# Patient Record
Sex: FEMALE | Race: OTHER | HISPANIC OR LATINO
[De-identification: names, ages, dates, MRNs, and addresses within clinical notes are randomized per-mention and may not be internally consistent; named-entity substitution may affect disease eponyms.]

---

## 2022-07-15 ENCOUNTER — APPOINTMENT (OUTPATIENT)
Dept: ANTEPARTUM | Facility: CLINIC | Age: 37
End: 2022-07-15

## 2022-07-15 ENCOUNTER — TRANSCRIPTION ENCOUNTER (OUTPATIENT)
Age: 37
End: 2022-07-15

## 2022-07-15 ENCOUNTER — ASOB RESULT (OUTPATIENT)
Age: 37
End: 2022-07-15

## 2022-07-15 PROBLEM — Z00.00 ENCOUNTER FOR PREVENTIVE HEALTH EXAMINATION: Status: ACTIVE | Noted: 2022-07-15

## 2022-07-15 PROCEDURE — 76801 OB US < 14 WKS SINGLE FETUS: CPT | Mod: NC

## 2022-07-15 PROCEDURE — 76813 OB US NUCHAL MEAS 1 GEST: CPT

## 2022-08-12 ENCOUNTER — APPOINTMENT (OUTPATIENT)
Dept: ANTEPARTUM | Facility: CLINIC | Age: 37
End: 2022-08-12

## 2022-08-12 ENCOUNTER — ASOB RESULT (OUTPATIENT)
Age: 37
End: 2022-08-12

## 2022-08-12 PROCEDURE — 76811 OB US DETAILED SNGL FETUS: CPT

## 2022-08-18 ENCOUNTER — EMERGENCY (EMERGENCY)
Facility: HOSPITAL | Age: 37
LOS: 1 days | Discharge: HOME | End: 2022-08-18
Attending: EMERGENCY MEDICINE | Admitting: EMERGENCY MEDICINE
Payer: MEDICAID

## 2022-08-18 VITALS
DIASTOLIC BLOOD PRESSURE: 76 MMHG | RESPIRATION RATE: 18 BRPM | HEART RATE: 98 BPM | TEMPERATURE: 98 F | OXYGEN SATURATION: 98 % | SYSTOLIC BLOOD PRESSURE: 116 MMHG

## 2022-08-18 VITALS
OXYGEN SATURATION: 99 % | HEART RATE: 94 BPM | RESPIRATION RATE: 16 BRPM | SYSTOLIC BLOOD PRESSURE: 128 MMHG | DIASTOLIC BLOOD PRESSURE: 86 MMHG | TEMPERATURE: 98 F | HEIGHT: 66 IN | WEIGHT: 164.91 LBS

## 2022-08-18 DIAGNOSIS — D25.1 INTRAMURAL LEIOMYOMA OF UTERUS: ICD-10-CM

## 2022-08-18 DIAGNOSIS — Z3A.16 16 WEEKS GESTATION OF PREGNANCY: ICD-10-CM

## 2022-08-18 DIAGNOSIS — O99.112 OTHER DISEASES OF THE BLOOD AND BLOOD-FORMING ORGANS AND CERTAIN DISORDERS INVOLVING THE IMMUNE MECHANISM COMPLICATING PREGNANCY, SECOND TRIMESTER: ICD-10-CM

## 2022-08-18 DIAGNOSIS — O34.12 MATERNAL CARE FOR BENIGN TUMOR OF CORPUS UTERI, SECOND TRIMESTER: ICD-10-CM

## 2022-08-18 DIAGNOSIS — Z20.822 CONTACT WITH AND (SUSPECTED) EXPOSURE TO COVID-19: ICD-10-CM

## 2022-08-18 DIAGNOSIS — D25.2 SUBSEROSAL LEIOMYOMA OF UTERUS: ICD-10-CM

## 2022-08-18 DIAGNOSIS — D25.0 SUBMUCOUS LEIOMYOMA OF UTERUS: ICD-10-CM

## 2022-08-18 DIAGNOSIS — Z90.79 ACQUIRED ABSENCE OF OTHER GENITAL ORGAN(S): ICD-10-CM

## 2022-08-18 DIAGNOSIS — D72.829 ELEVATED WHITE BLOOD CELL COUNT, UNSPECIFIED: ICD-10-CM

## 2022-08-18 DIAGNOSIS — Z87.59 PERSONAL HISTORY OF OTHER COMPLICATIONS OF PREGNANCY, CHILDBIRTH AND THE PUERPERIUM: ICD-10-CM

## 2022-08-18 DIAGNOSIS — R10.2 PELVIC AND PERINEAL PAIN: ICD-10-CM

## 2022-08-18 LAB
ALBUMIN SERPL ELPH-MCNC: 4.2 G/DL — SIGNIFICANT CHANGE UP (ref 3.3–5)
ALP SERPL-CCNC: 73 U/L — SIGNIFICANT CHANGE UP (ref 40–120)
ALT FLD-CCNC: 13 U/L — SIGNIFICANT CHANGE UP (ref 10–45)
ANION GAP SERPL CALC-SCNC: 11 MMOL/L — SIGNIFICANT CHANGE UP (ref 5–17)
APPEARANCE UR: CLEAR — SIGNIFICANT CHANGE UP
AST SERPL-CCNC: 17 U/L — SIGNIFICANT CHANGE UP (ref 10–40)
BACTERIA # UR AUTO: PRESENT /HPF
BASOPHILS # BLD AUTO: 0.07 K/UL — SIGNIFICANT CHANGE UP (ref 0–0.2)
BASOPHILS NFR BLD AUTO: 0.4 % — SIGNIFICANT CHANGE UP (ref 0–2)
BILIRUB SERPL-MCNC: 0.8 MG/DL — SIGNIFICANT CHANGE UP (ref 0.2–1.2)
BILIRUB UR-MCNC: NEGATIVE — SIGNIFICANT CHANGE UP
BUN SERPL-MCNC: 7 MG/DL — SIGNIFICANT CHANGE UP (ref 7–23)
CALCIUM SERPL-MCNC: 9.4 MG/DL — SIGNIFICANT CHANGE UP (ref 8.4–10.5)
CHLORIDE SERPL-SCNC: 98 MMOL/L — SIGNIFICANT CHANGE UP (ref 96–108)
CO2 SERPL-SCNC: 23 MMOL/L — SIGNIFICANT CHANGE UP (ref 22–31)
COLOR SPEC: YELLOW — SIGNIFICANT CHANGE UP
CREAT SERPL-MCNC: 0.55 MG/DL — SIGNIFICANT CHANGE UP (ref 0.5–1.3)
DIFF PNL FLD: NEGATIVE — SIGNIFICANT CHANGE UP
EGFR: 122 ML/MIN/1.73M2 — SIGNIFICANT CHANGE UP
EOSINOPHIL # BLD AUTO: 0.08 K/UL — SIGNIFICANT CHANGE UP (ref 0–0.5)
EOSINOPHIL NFR BLD AUTO: 0.5 % — SIGNIFICANT CHANGE UP (ref 0–6)
EPI CELLS # UR: SIGNIFICANT CHANGE UP /HPF (ref 0–5)
GLUCOSE SERPL-MCNC: 93 MG/DL — SIGNIFICANT CHANGE UP (ref 70–99)
GLUCOSE UR QL: NEGATIVE — SIGNIFICANT CHANGE UP
HCT VFR BLD CALC: 34 % — LOW (ref 34.5–45)
HGB BLD-MCNC: 11.9 G/DL — SIGNIFICANT CHANGE UP (ref 11.5–15.5)
IMM GRANULOCYTES NFR BLD AUTO: 0.5 % — SIGNIFICANT CHANGE UP (ref 0–1.5)
KETONES UR-MCNC: NEGATIVE — SIGNIFICANT CHANGE UP
LEUKOCYTE ESTERASE UR-ACNC: ABNORMAL
LYMPHOCYTES # BLD AUTO: 1.63 K/UL — SIGNIFICANT CHANGE UP (ref 1–3.3)
LYMPHOCYTES # BLD AUTO: 9.5 % — LOW (ref 13–44)
MCHC RBC-ENTMCNC: 29.7 PG — SIGNIFICANT CHANGE UP (ref 27–34)
MCHC RBC-ENTMCNC: 35 GM/DL — SIGNIFICANT CHANGE UP (ref 32–36)
MCV RBC AUTO: 84.8 FL — SIGNIFICANT CHANGE UP (ref 80–100)
MONOCYTES # BLD AUTO: 0.79 K/UL — SIGNIFICANT CHANGE UP (ref 0–0.9)
MONOCYTES NFR BLD AUTO: 4.6 % — SIGNIFICANT CHANGE UP (ref 2–14)
NEUTROPHILS # BLD AUTO: 14.49 K/UL — HIGH (ref 1.8–7.4)
NEUTROPHILS NFR BLD AUTO: 84.5 % — HIGH (ref 43–77)
NITRITE UR-MCNC: NEGATIVE — SIGNIFICANT CHANGE UP
NRBC # BLD: 0 /100 WBCS — SIGNIFICANT CHANGE UP (ref 0–0)
PH UR: 6 — SIGNIFICANT CHANGE UP (ref 5–8)
PLATELET # BLD AUTO: 185 K/UL — SIGNIFICANT CHANGE UP (ref 150–400)
POTASSIUM SERPL-MCNC: 4.2 MMOL/L — SIGNIFICANT CHANGE UP (ref 3.5–5.3)
POTASSIUM SERPL-SCNC: 4.2 MMOL/L — SIGNIFICANT CHANGE UP (ref 3.5–5.3)
PROT SERPL-MCNC: 8 G/DL — SIGNIFICANT CHANGE UP (ref 6–8.3)
PROT UR-MCNC: NEGATIVE MG/DL — SIGNIFICANT CHANGE UP
RBC # BLD: 4.01 M/UL — SIGNIFICANT CHANGE UP (ref 3.8–5.2)
RBC # FLD: 13.8 % — SIGNIFICANT CHANGE UP (ref 10.3–14.5)
RBC CASTS # UR COMP ASSIST: < 5 /HPF — SIGNIFICANT CHANGE UP
SARS-COV-2 RNA SPEC QL NAA+PROBE: NEGATIVE — SIGNIFICANT CHANGE UP
SODIUM SERPL-SCNC: 132 MMOL/L — LOW (ref 135–145)
SP GR SPEC: 1.01 — SIGNIFICANT CHANGE UP (ref 1–1.03)
UROBILINOGEN FLD QL: 0.2 E.U./DL — SIGNIFICANT CHANGE UP
WBC # BLD: 17.15 K/UL — HIGH (ref 3.8–10.5)
WBC # FLD AUTO: 17.15 K/UL — HIGH (ref 3.8–10.5)
WBC UR QL: < 5 /HPF — SIGNIFICANT CHANGE UP

## 2022-08-18 PROCEDURE — 76817 TRANSVAGINAL US OBSTETRIC: CPT

## 2022-08-18 PROCEDURE — 99284 EMERGENCY DEPT VISIT MOD MDM: CPT | Mod: 25

## 2022-08-18 PROCEDURE — 76805 OB US >/= 14 WKS SNGL FETUS: CPT | Mod: 26

## 2022-08-18 PROCEDURE — 76817 TRANSVAGINAL US OBSTETRIC: CPT | Mod: 26

## 2022-08-18 PROCEDURE — 36415 COLL VENOUS BLD VENIPUNCTURE: CPT

## 2022-08-18 PROCEDURE — 80053 COMPREHEN METABOLIC PANEL: CPT

## 2022-08-18 PROCEDURE — 85025 COMPLETE CBC W/AUTO DIFF WBC: CPT

## 2022-08-18 PROCEDURE — 99285 EMERGENCY DEPT VISIT HI MDM: CPT

## 2022-08-18 PROCEDURE — 87086 URINE CULTURE/COLONY COUNT: CPT

## 2022-08-18 PROCEDURE — 76805 OB US >/= 14 WKS SNGL FETUS: CPT

## 2022-08-18 PROCEDURE — 81001 URINALYSIS AUTO W/SCOPE: CPT

## 2022-08-18 PROCEDURE — 96366 THER/PROPH/DIAG IV INF ADDON: CPT

## 2022-08-18 PROCEDURE — 87635 SARS-COV-2 COVID-19 AMP PRB: CPT

## 2022-08-18 PROCEDURE — 96365 THER/PROPH/DIAG IV INF INIT: CPT

## 2022-08-18 RX ORDER — INDOMETHACIN 50 MG
1 CAPSULE ORAL
Qty: 8 | Refills: 0
Start: 2022-08-18 | End: 2022-08-19

## 2022-08-18 RX ORDER — INDOMETHACIN 50 MG
50 CAPSULE ORAL ONCE
Refills: 0 | Status: COMPLETED | OUTPATIENT
Start: 2022-08-18 | End: 2022-08-18

## 2022-08-18 RX ORDER — ACETAMINOPHEN 500 MG
1000 TABLET ORAL ONCE
Refills: 0 | Status: COMPLETED | OUTPATIENT
Start: 2022-08-18 | End: 2022-08-18

## 2022-08-18 RX ADMIN — Medication 1000 MILLIGRAM(S): at 16:00

## 2022-08-18 RX ADMIN — Medication 400 MILLIGRAM(S): at 11:01

## 2022-08-18 RX ADMIN — Medication 50 MILLIGRAM(S): at 16:11

## 2022-08-18 NOTE — ED PROVIDER NOTE - PHYSICAL EXAMINATION
VITAL SIGNS: I have reviewed nursing notes and confirm.  CONSTITUTIONAL: Well appearing, in no acute distress.   SKIN:  warm and dry, no acute rash.   HEAD:  normocephalic, atraumatic.  EYES: EOM intact; conjunctiva and sclera clear.  ENT: No nasal discharge; airway clear.   NECK: Supple.  CARD: S1, S2, Regular rate and rhythm.   RESP:  Clear to auscultation b/l, no wheezes, rales or rhonchi.  ABD: Normal bowel sounds; soft; non-distended; no guarding/ rebound. Gravid uterus. Fundus palpable to about 1 cm above umbilicus. LLQ tenderness to palpation.  EXT: Normal ROM. No peripheral edema. Pulses intact and equal b/l.  NEURO: Alert, oriented, grossly unremarkable  PSYCH: Cooperative, mood and affect appropriate. VITAL SIGNS: I have reviewed nursing notes and confirm.  CONSTITUTIONAL: Well appearing, in no acute distress.   SKIN:  warm and dry, no acute rash.   HEAD:  normocephalic, atraumatic.  EYES: EOM intact; conjunctiva and sclera clear.  ENT: No nasal discharge; airway clear.   NECK: Supple.  CARD: S1, S2, Regular rate and rhythm.   RESP:  Clear to auscultation b/l, no wheezes, rales or rhonchi.  ABD: Normal bowel sounds; soft; no guarding/ rebound. Gravid uterus. Fundus palpable to about 1-2 cm above umbilicus. LLQ tenderness to palpation.  PELVICL: deferred to gyn.  EXT: Normal ROM. No peripheral edema. Pulses intact and equal b/l.  NEURO: Alert, oriented, grossly unremarkable  PSYCH: Cooperative, mood and affect appropriate.

## 2022-08-18 NOTE — ED PROVIDER NOTE - OBJECTIVE STATEMENT
36 y/o F  at 16 weeks with PMHx of fibroids presents to ED c/o LLQ/pelvic pain starting 3 days ago. Pain is described as sharp, constant, and radiates to back. No alleviating or aggravating factors. No associated N/V/D, constipation, dysuria, or hematuria. Denies fever or chills. Pt was evaluated at [facility] 2 days ago and had an US done which showed enlargement of fibroids to 8x8 cm. She was started on Keflex for possible UTI but has not started the antibiotic. Pt here now for persistent pain. 38 y/o F  at 16 weeks gestation with PMHx of fibroids presents to ED c/o LLQ/pelvic pain starting 3 days ago. Pain is described as sharp, constant, and radiates to back. No alleviating or aggravating factors. No associated N/V/D, constipation, dysuria, hematuria, vaginal bleeding, gush of water from vagina. Denies fever or chills. Pt was evaluated at [facility] 2 days ago and had an US done which showed enlargement of fibroids to 8x8 cm. She was started on Keflex for possible UTI but has not started the antibiotic. Pt here now for persistent pain.

## 2022-08-18 NOTE — CONSULT NOTE ADULT - ASSESSMENT
38yo  at 17w with PMHx s/f uterine leiomyomas presenting to ER with 4 days of constant 9/10 sharp pain originating with LLQ and radiating to back and across lower pelvic region.   36yo  at 17w with PMHx s/f uterine leiomyomas presenting to ER with 4 days of constant 9/10 sharp pain originating with LLQ and radiating to back and across lower pelvic region.  - pelvic pain likely from degenerating fibroids, given large fibroids visualized on pelvic US.  - ovarian torsion unlikely given normal appearing ovaries without cysts/masses, with normal flow to both ovaries  - PID unlikely given afebrile state, WBC not significantly elevated, no abnormal vaginal d/c or CMT.  - UTI unlikely given normal UA, and no dysuria.  - GI etiology unlikely given no N/V, or abnormalities of her BM.  - patient to be discharged with indomethacin. 50mg dose to be given in ED, with prescription 25mg q6h for maximum of 2 days. Patient counseled to take no more than this regimen given risk of early closing of the ductus arteriosis in the fetus, or kidney injury to the fetus. Patient voided understanding.  - patient has f/u with her OB/GYN tomorrow  - GYN to sign off.  - d/w Dr. Saez

## 2022-08-18 NOTE — ED ADULT NURSE NOTE - OBJECTIVE STATEMENT
pt is a 38 y/o F, , presenting to ED for c/o L pelvic pain x 4 days, seen 22 at [facility], had US done (finding of uterine fibroid), urinalysis- Rx w/ Keflex for UTI (pt has not taken due to no urinary sx), now states pain has not improved. Denies n/v/d, fever, chills, cough, cp or sob, vaginal bleeding or discharge.

## 2022-08-18 NOTE — ED PROVIDER NOTE - CLINICAL SUMMARY MEDICAL DECISION MAKING FREE TEXT BOX
Impression:  at 16 weeks gestation with known h/o fibroids who presents with persistent LLQ/pelvic pain x4 days. Seen at [facility] 2 days ago and had US done which showed 8x8 cm fibroid. Will check labs, UA, arrange for pelvic US, consult with GYN, and give IV tylenol for pain. Impression:  at 16 weeks gestation with known h/o fibroids who presents with persistent LLQ/pelvic pain x4 days. Se[facility] at [facility] 2 days ago and had US done which showed fibroids, the largest of which measures 8x8 cm. Will check labs, UA, arrange for pelvic US, consult with GYN, and give IV tylenol for pain.    + non-specific leukocytosis to 17, likely stress rxn. UA neg for infection. Pelvic us results as noted below:    1.  There is a single live intrauterine gestation with average ultrasound   age of 17 weeks 4 days and a fetal heart rate of 163 bpm. A formal fetal   anatomic survey at 18-20 weeks is suggested for more complete evaluation.  2.  There are a few uterine fibroids including a 3.8 cm   intracavitary/submucosal fibroid in the posterior uterine body which   protrudes into the gestational sac. Large posterior lower uterine segment   9.1 cm intramural/subserosal fibroid.    Pt seen by gyn. Abd pain thought to be 2/2 degenerating fibroid. ED evaluation and management discussed with the patient in detail. Will bolus w/ indomethacin 50mg now and dc home w/ rx for indomethacin 25mg q6 hrs x 2 d only. Pt has f/u with Dr. Toledo tomorrow. Strict ED return instructions discussed in detail and patient given the opportunity to ask any questions about their discharge diagnosis and instructions. Patient verbalized understanding.

## 2022-08-18 NOTE — CONSULT NOTE ADULT - SUBJECTIVE AND OBJECTIVE BOX
38yo  at 17w with PMHx s/f uterine leiomyomas presenting to ER with 4 days of constant 9/10 sharp pain originating with LLQ and radiating to back and across lower pelvic region.  Denies. CTX,  LOF,  VB, no FM yet.  Patient states that pain began 8/15 and persisted without relief, leading to patient going to [facility] ER on . Resulting assessment revealed: no abnormalities from blood draw, increase in left-sided fibroid size from 7cm (2 months ago) to 8.5cm, and UA eiquivocal for UTI (positive for bacteria, no nitrites). Patient was given course of Keflex but was told it was optional given equivocal results and no urinary complaints. Pain currently is unchanged after visit to [facility]. Patient states that pain is worse with prolonged standing or lying supine. Changes with position, ambulation, and Tylenol has not been providing her any relief. She denies F/C, nausea, vomiting, headaches, vaginal discharge, dysuria, and changes in bowel movements.   OBx: G1: elective  w/ D&C  G2: current pregnancy  Gynx: denies ab pap, STIs, ovarian cysts, endometriosis  PMHx: none  PSHx: left salpingectomy in  for blocked/infected tube identified with HSG  Allx: NKDA  Medx: denies    Vital Signs Last 24 Hrs  T(C): 36.8 (18 Aug 2022 09:44), Max: 36.8 (18 Aug 2022 09:44)  T(F): 98.3 (18 Aug 2022 09:44), Max: 98.3 (18 Aug 2022 09:44)  HR: 94 (18 Aug 2022 09:44) (94 - 94)  BP: 128/86 (18 Aug 2022 09:44) (128/86 - 128/86)  BP(mean): --  RR: 16 (18 Aug 2022 09:44) (16 - 16)  SpO2: 99% (18 Aug 2022 09:44) (99% - 99%)    Parameters below as of 18 Aug 2022 09:44  Patient On (Oxygen Delivery Method): room air        PE:  Lungs:  abd: Gravid uterus; no guarding or rebound tenderness. Tenderness on palpation of LLQ, suprapubic region, LRQ, and uterine movement. Pain focal on palpation LLQ. No CVA tenderness  No skin changes or rashes over abdomen.  Spec:  BME:    LABS:                        11.9   17.15 )-----------( 185      ( 18 Aug 2022 11:07 )             34.0     08-18    132<L>  |  98  |  7   ----------------------------<  93  4.2   |  23  |  0.55    Ca    9.4      18 Aug 2022 11:07    TPro  8.0  /  Alb  4.2  /  TBili  0.8  /  DBili  x   /  AST  17  /  ALT  13  /  AlkPhos  73  08-      Urinalysis Basic - ( 18 Aug 2022 10:29 )    Color: Yellow / Appearance: Clear / S.015 / pH: x  Gluc: x / Ketone: NEGATIVE  / Bili: Negative / Urobili: 0.2 E.U./dL   Blood: x / Protein: NEGATIVE mg/dL / Nitrite: NEGATIVE   Leuk Esterase: Small / RBC: < 5 /HPF / WBC < 5 /HPF   Sq Epi: x / Non Sq Epi: 0-5 /HPF / Bacteria: Present /HPF        RADIOLOGY & ADDITIONAL STUDIES:      ACC: [id number] EXAM:  US OB TRANSVAGINAL                        ACC: [id number] EXAM:  US OB GRT THAN 14 WKS 1ST GEST                          PROCEDURE DATE:  2022          INTERPRETATION:  OBSTETRICAL ULTRASOUND - SECOND TRIMESTER dated   2022 2:05 PM    INDICATION: 37 years Female with second trimester pregnancy and left   lower quadrant pain. History of fibroids. LMP: 2022.    TECHNIQUE: Transabdominal and transvaginal views of the pelvis were   obtained.    PRIOR STUDIES: None.    FINDINGS:  By dates, the estimated gestational age is 16 weeks 2 days.    There is a 9.1 x 9.0 x 7.9 cm subserosal/intramural fibroid in the   posterior lower uterine segment, a 3.5 x 3.7 x 3.8 cm   intracavitary/submucosal fibroid in the posterior uterine body which   protrudes into the gestational sac, and a 3.8 x 3.2 x 3.3 cm   submucosal/intramural fibroid in the left posterior uterine body.    A single intrauterine gestation is visible with an average ultrasound age   of 17 weeks 4 days.    Biparietal diameter is 3.8 cm, corresponding to a gestational age of 17   weeks 5 days.    Head circumference is 14.7 cm, corresponding to a gestational age of 18   weeks 0 days.    Abdominal circumference is 10.5 cm, corresponding to a gestational age of   16 weeks 4 days.    Femur length is 2.5 cm, corresponding to a gestational age of 17 weeks 4   days.    Fetal cardiac motion is visible with fetal heart rate of 163 beats per   minute.    A normal amount of amniotic fluid is evident. The placenta is located   anteriorly. No placenta previa is evident.  No subchorionic bleed is   visible.  The cervix is closed with a length of 4.2 cm.    The right ovary is normal in size, measuring 3.3 x 2.3 x 3.6 cm with a   calculated volume of 14 mL. No right ovarian masses are seen. The left   ovary is normal in size, measuring 3.2 x 1.8 x 2.2 cm with a calculated   volume of 7 mL. No left ovarian masses are seen. Doppler evaluation   demonstrates flow to both ovaries with no evidence of torsion.    No free fluid is seen in the cul-de-sac.      IMPRESSION:  1.  There is a single live intrauterine gestation with average ultrasound   age of 17 weeks 4 days and a fetal heart rate of 163 bpm. A formal fetal   anatomic survey at 18-20 weeks is suggested for more complete evaluation.  2.  There are a few uterine fibroids including a 3.8 cm   intracavitary/submucosal fibroid in the posterior uterine body which   protrudes into the gestational sac. Large posterior lower uterine segment   9.1 cm intramural/subserosal fibroid.      Please note this study was not optimized for the evaluation of fetal   anatomy which should be performed on a separate basis.    --- End of Report ---          TANA GALVEZ MD; Resident Radiologist  This document has been electronically signed.  JOSE ROBERTO ROMANO MD; Attending Radiologist  This document has been electronically signed. Aug 18 2022  2:36PM     38yo  at 17w with PMHx s/f uterine leiomyomas presenting to ER with 4 days of constant 9/10 sharp pain originating with LLQ and radiating to back and across lower pelvic region.  Denies. CTX,  LOF,  VB, no FM yet.  Patient states that pain began 8/15 and persisted without relief, leading to patient going to [facility] ER on . Resulting assessment revealed: no abnormalities from blood draw, increase in left-sided fibroid size from 7cm (2 months ago) to 8.5cm, and UA eiquivocal for UTI (positive for bacteria, no nitrites).  WBC at the time was 13.5. Patient was given course of Keflex but was told it was optional given equivocal results and no urinary complaints. Pain currently is unchanged after visit to [facility]. Patient states that pain is worse with prolonged standing or lying supine. Changes with position, ambulation, and Tylenol has not been providing her any relief. She denies F/C, nausea, vomiting, headaches, vaginal discharge, dysuria, and changes in bowel movements.   OBx: G1: elective  w/ D&C  G2: current pregnancy  Gynx: denies ab pap, STIs, ovarian cysts, endometriosis  PMHx: none  PSHx: left salpingectomy in  for blocked/infected tube identified with HSG  Allx: NKDA  Medx: denies    Vital Signs Last 24 Hrs  T(C): 36.8 (18 Aug 2022 09:44), Max: 36.8 (18 Aug 2022 09:44)  T(F): 98.3 (18 Aug 2022 09:44), Max: 98.3 (18 Aug 2022 09:44)  HR: 94 (18 Aug 2022 09:44) (94 - 94)  BP: 128/86 (18 Aug 2022 09:44) (128/86 - 128/86)  BP(mean): --  RR: 16 (18 Aug 2022 09:44) (16 - 16)  SpO2: 99% (18 Aug 2022 09:44) (99% - 99%)    Parameters below as of 18 Aug 2022 09:44  Patient On (Oxygen Delivery Method): room air        PE:  Lungs:  abd: Gravid uterus; no guarding or rebound tenderness. Tenderness on palpation of LLQ, suprapubic region, LRQ, and uterine movement. Pain focal on palpation LLQ. No CVA tenderness  No skin changes or rashes over abdomen.  Spec:  BME:    LABS:                        11.9   17.15 )-----------( 185      ( 18 Aug 2022 11:07 )             34.0     08-18    132<L>  |  98  |  7   ----------------------------<  93  4.2   |  23  |  0.55    Ca    9.4      18 Aug 2022 11:07    TPro  8.0  /  Alb  4.2  /  TBili  0.8  /  DBili  x   /  AST  17  /  ALT  13  /  AlkPhos  73        Urinalysis Basic - ( 18 Aug 2022 10:29 )    Color: Yellow / Appearance: Clear / S.015 / pH: x  Gluc: x / Ketone: NEGATIVE  / Bili: Negative / Urobili: 0.2 E.U./dL   Blood: x / Protein: NEGATIVE mg/dL / Nitrite: NEGATIVE   Leuk Esterase: Small / RBC: < 5 /HPF / WBC < 5 /HPF   Sq Epi: x / Non Sq Epi: 0-5 /HPF / Bacteria: Present /HPF        RADIOLOGY & ADDITIONAL STUDIES:      ACC: [id number] EXAM:  US OB TRANSVAGINAL                        ACC: [id number] EXAM:  US OB GRT THAN 14 WKS 1ST GEST                          PROCEDURE DATE:  2022          INTERPRETATION:  OBSTETRICAL ULTRASOUND - SECOND TRIMESTER dated   2022 2:05 PM    INDICATION: 37 years Female with second trimester pregnancy and left   lower quadrant pain. History of fibroids. LMP: 2022.    TECHNIQUE: Transabdominal and transvaginal views of the pelvis were   obtained.    PRIOR STUDIES: None.    FINDINGS:  By dates, the estimated gestational age is 16 weeks 2 days.    There is a 9.1 x 9.0 x 7.9 cm subserosal/intramural fibroid in the   posterior lower uterine segment, a 3.5 x 3.7 x 3.8 cm   intracavitary/submucosal fibroid in the posterior uterine body which   protrudes into the gestational sac, and a 3.8 x 3.2 x 3.3 cm   submucosal/intramural fibroid in the left posterior uterine body.    A single intrauterine gestation is visible with an average ultrasound age   of 17 weeks 4 days.    Biparietal diameter is 3.8 cm, corresponding to a gestational age of 17   weeks 5 days.    Head circumference is 14.7 cm, corresponding to a gestational age of 18   weeks 0 days.    Abdominal circumference is 10.5 cm, corresponding to a gestational age of   16 weeks 4 days.    Femur length is 2.5 cm, corresponding to a gestational age of 17 weeks 4   days.    Fetal cardiac motion is visible with fetal heart rate of 163 beats per   minute.    A normal amount of amniotic fluid is evident. The placenta is located   anteriorly. No placenta previa is evident.  No subchorionic bleed is   visible.  The cervix is closed with a length of 4.2 cm.    The right ovary is normal in size, measuring 3.3 x 2.3 x 3.6 cm with a   calculated volume of 14 mL. No right ovarian masses are seen. The left   ovary is normal in size, measuring 3.2 x 1.8 x 2.2 cm with a calculated   volume of 7 mL. No left ovarian masses are seen. Doppler evaluation   demonstrates flow to both ovaries with no evidence of torsion.    No free fluid is seen in the cul-de-sac.      IMPRESSION:  1.  There is a single live intrauterine gestation with average ultrasound   age of 17 weeks 4 days and a fetal heart rate of 163 bpm. A formal fetal   anatomic survey at 18-20 weeks is suggested for more complete evaluation.  2.  There are a few uterine fibroids including a 3.8 cm   intracavitary/submucosal fibroid in the posterior uterine body which   protrudes into the gestational sac. Large posterior lower uterine segment   9.1 cm intramural/subserosal fibroid.      Please note this study was not optimized for the evaluation of fetal   anatomy which should be performed on a separate basis.    --- End of Report ---          TANA GALVEZ MD; Resident Radiologist  This document has been electronically signed.  JOSE ROBERTO ROMANO MD; Attending Radiologist  This document has been electronically signed. Aug 18 2022  2:36PM

## 2022-08-18 NOTE — ED PROVIDER NOTE - PATIENT PORTAL LINK FT
You can access the FollowMyHealth Patient Portal offered by [facility] by registering at the following website: [email or web address]. By joining [employer]’s FollowMyHealth portal, you will also be able to view your health information using other applications (apps) compatible with our system.

## 2022-08-18 NOTE — ED ADULT TRIAGE NOTE - OTHER COMPLAINTS
, approximately 16 weeks, endorsign left pelvic pain since monday. went to [facility] dx with fibroids and uti rx of keflex. denies bleeding

## 2022-08-18 NOTE — ED PROVIDER NOTE - CARE PROVIDER_API CALL
Trae Toledo)  Obstetrics and Gynecology  [address] [place] [address]  Phone: [phone]  Fax: [phone]  Follow Up Time:

## 2022-08-18 NOTE — ED ADULT NURSE NOTE - OTHER COMPLAINTS
, approximately 16 weeks, endorsign left pelvic pain since monday. went to [facility] dx with fibroids and uti rx of keflex. denies bleeding

## 2022-08-18 NOTE — ED ADULT NURSE REASSESSMENT NOTE - NS ED NURSE REASSESS COMMENT FT1
All labs, US resulted, abdominal/pelvic pain improved s/p meds Acetaminophen IVPB,  Indomethacin PO.  Vital signs stable.  Discharged to home in stable condition.

## 2022-08-18 NOTE — ED PROVIDER NOTE - NSFOLLOWUPINSTRUCTIONS_ED_ALL_ED_FT
Take indomethacin every 6 hours for the next 2 days only.  Take tylenol in addition if still in pain.  Follow up with Dr. Toledo as scheduled tomorrow.  Return to er for any new or worsening symptoms (worsening abdominal pain, vaginal bleeding, fever, chills, dizziness, weakness...).                  Log Out.      [employer] CareNotes®     :  [facility]  	                     UTERINE FIBROIDS - General Information           Uterine Fibroids    WHAT YOU NEED TO KNOW:    What are uterine fibroids? Uterine fibroids are growths found inside your uterus. Uterine fibroids also may be called tumors or leiomyomas. Uterine fibroids often appear in groups, or you may have only one. They can be small or large, and they can grow. They are almost always benign (not cancer) and likely will not spread to other parts of your body. They may grow when you are pregnant and shrink after you no longer have a monthly period.  Uterine Fibroid         What increases my risk for uterine fibroids? The cause of uterine fibroids is not clear. Ask your healthcare provider about these and other risk factors for uterine fibroids:  •A family history of uterine fibroids      •Too many female hormones, especially estrogen      •Menstrual periods starting before age 13      •Too much body weight      •Not having children      •Drinking alcohol      What are the signs and symptoms of uterine fibroids? You may have no signs or symptoms. Symptoms depend on the size, type, and number of fibroids you have. Symptoms also depend on where the fibroids are inside your uterus:  •Heavy or painful menstrual bleeding      •Pelvic pressure and pain      •Increased pelvic pain during sex      •Constipation or pain when you have a bowel movement      •Need to urinate often      How are uterine fibroids diagnosed? Your healthcare provider will examine you and ask about your symptoms. Tell the provider if any women if your family have had uterine fibroids. You may also need any of the following:   •A pelvic exam is also called an internal or vaginal exam. During a pelvic exam, your healthcare provider gently puts a speculum into your vagina. A speculum is a tool that opens your vagina. This lets your healthcare provider see your cervix (bottom part of your uterus). With gloved hands, your healthcare provider will check the size and shape of your uterus and ovaries.       •A vaginal ultrasound is used to see inside your uterus and to check your ovaries. During this test, your healthcare provider places a small wand in your vagina. Sound waves from the wand show pictures of the uterus and ovaries.      •A biopsy is a tissue sample of a fibroid that your healthcare provider takes from your uterus for testing.      How are uterine fibroids treated? You may not need treatment for your fibroids if you do not have symptoms. The following treatments may shrink your fibroids and help your pain:   •Hormones may help shrink your fibroids.      •Contraceptives help prevent pregnancy. They also may help shrink your fibroids.      •NSAIDs help decrease swelling and pain or fever. This medicine is available with or without a doctor's order. NSAIDs can cause stomach bleeding or kidney problems in certain people. If you take blood thinner medicine, always ask your healthcare provider if NSAIDs are safe for you. Always read the medicine label and follow directions.      •Surgery may be used to remove your uterine fibroids. Surgery may instead be used to block or slow the flow of blood to the fibroid. This may make your fibroids shrink or disappear. Surgery called a hysterectomy may be needed if your fibroids are severe. For this surgery, your healthcare provider removes your entire uterus. After this surgery, you will no longer be able to have children.      What can I do to prevent uterine fibroids?   •Maintain a healthy weight. Extra weight can cause fibroids to grow. Talk to your healthcare provider about a healthy weight for you. He or she can help you create a healthy weight loss plan if you are overweight.      •Eat a variety of healthy foods. Healthy foods include fruits, vegetables, lean meats, fish, low-fat dairy foods, cooked beans, and whole-grain breads and cereals. Fruits and vegetables are especially important for helping lower the risk for fibroids. Your healthcare provider or a dietitian can help you create a healthy meal plan.  Healthy Foods           •Limit or do not drink alcohol as directed. Alcohol can increase your risk for fibroids. A drink of alcohol is 12 ounces of beer, 1½ ounces of liquor, or 5 ounces of wine. Ask your healthcare provider for information if you need help to quit drinking alcohol.      When should I seek immediate care?   •Your heart begins to race, and you feel faint.      •You begin to pass large blood clots from your vagina.      When should I call my doctor or gynecologist?   •Your symptoms, such as heavy bleeding, pain, or pelvic pressure, worsen.      •You feel weak and are more tired than usual.      •You do not feel like your bladder is empty after you urinate. You also may urinate small amounts more often.       •You have questions or concerns about your condition or care.      CARE AGREEMENT:    You have the right to help plan your care. Learn about your health condition and how it may be treated. Discuss treatment options with your healthcare providers to decide what care you want to receive. You always have the right to refuse treatment.        © Copyright [employer] 2022           back to top                          © Copyright [employer] 2022

## 2022-08-18 NOTE — CONSULT NOTE ADULT - SUBJECTIVE AND OBJECTIVE BOX
36yo  at 16w with PMHx s/f uterine leiomyomas presenting to ER with 4 days of constant 9/10 sharp pain originating with LLQ and radiating to back and across lower pelvic region.  CTX-  LOF-  VB-  FM(early gestation)  Patient states that pain began 8/15 and persisted without relief, leading to patient going to [facility] ER on . Resulting assessment revealed: no abnormalities from blood draw, increase in left-sided fibroid size from 7cm (2 months ago) to 8.5cm, and UA positive for bacteria. Patient was given course of Keflax but did not take. Pain was unchanged after visit. Patient states that pain does not change with position, ambulation, or Tylenol. She denies nausea, vomiting, headaches, vaginal discharge, dysuria, and changes in bowel movements.   OBx: G1: elective  w/ D&C  G2: current pregnancy  Gynx: denies ab pap, STIs, ovarian cysts, endometriosis  PMHx: none  PSHx: left salpingectomy in  for blocked/infected tube identified with HSG  Allx: NKDA  Medx: denies        PE:  Gravid uterus; no guarding or rebound tenderness.  Tenderness on palpation of LLQ, suprapubic region, LRQ, and uterine movement. Pain focal on palpation LLQ.  No skin changes or rashes over abdomen.   38yo  at 16w with PMHx s/f uterine leiomyomas presenting to ER with 4 days of constant 9/10 sharp pain originating with LLQ and radiating to back and across lower pelvic region.  CTX-  LOF-  VB-  FM(early gestation)  Patient states that pain began 8/15 and persisted without relief, leading to patient going to [facility] ER on . Resulting assessment revealed: no abnormalities from blood draw, increase in left-sided fibroid size from 7cm (2 months ago) to 8.5cm, and UA positive for bacteria. Patient was given course of Keflex but did not take. Pain was unchanged after visit. Patient states that pain does not change with position, ambulation, or Tylenol. She denies nausea, vomiting, headaches, vaginal discharge, dysuria, and changes in bowel movements.   OBx: G1: elective  w/ D&C  G2: current pregnancy  Gynx: denies ab pap, STIs, ovarian cysts, endometriosis  PMHx: none  PSHx: left salpingectomy in  for blocked/infected tube identified with HSG  Allx: NKDA  Medx: denies        PE:  Gravid uterus; no guarding or rebound tenderness.  Tenderness on palpation of LLQ, suprapubic region, LRQ, and uterine movement. Pain focal on palpation LLQ.  No skin changes or rashes over abdomen.   38yo  at 17w with PMHx s/f uterine leiomyomas presenting to ER with 4 days of constant 9/10 sharp pain originating with LLQ and radiating to back and across lower pelvic region.  CTX-  LOF-  VB-  FM(early gestation)  Patient states that pain began 8/15 and persisted without relief, leading to patient going to [facility] ER on . Resulting assessment revealed: no abnormalities from blood draw, increase in left-sided fibroid size from 7cm (2 months ago) to 8.5cm, and UA positive for bacteria. Patient was given course of Keflex but did not take. Pain was unchanged after visit. Patient states that pain does not change with position, ambulation, or Tylenol. She denies nausea, vomiting, headaches, vaginal discharge, dysuria, and changes in bowel movements.   OBx: G1: elective  w/ D&C  G2: current pregnancy  Gynx: denies ab pap, STIs, ovarian cysts, endometriosis  PMHx: none  PSHx: left salpingectomy in  for blocked/infected tube identified with HSG  Allx: NKDA  Medx: denies        PE:  Gravid uterus; no guarding or rebound tenderness.  Tenderness on palpation of LLQ, suprapubic region, LRQ, and uterine movement. Pain focal on palpation LLQ.  No skin changes or rashes over abdomen.

## 2022-08-19 LAB
CULTURE RESULTS: NO GROWTH — SIGNIFICANT CHANGE UP
SPECIMEN SOURCE: SIGNIFICANT CHANGE UP

## 2022-09-16 ENCOUNTER — APPOINTMENT (OUTPATIENT)
Dept: ANTEPARTUM | Facility: CLINIC | Age: 37
End: 2022-09-16

## 2022-09-16 ENCOUNTER — ASOB RESULT (OUTPATIENT)
Age: 37
End: 2022-09-16

## 2022-09-16 PROBLEM — D21.9 BENIGN NEOPLASM OF CONNECTIVE AND OTHER SOFT TISSUE, UNSPECIFIED: Chronic | Status: ACTIVE | Noted: 2022-08-18

## 2022-09-16 PROCEDURE — 76816 OB US FOLLOW-UP PER FETUS: CPT

## 2022-11-11 ENCOUNTER — APPOINTMENT (OUTPATIENT)
Dept: ANTEPARTUM | Facility: CLINIC | Age: 37
End: 2022-11-11

## 2022-11-11 ENCOUNTER — ASOB RESULT (OUTPATIENT)
Age: 37
End: 2022-11-11

## 2022-11-11 PROCEDURE — 76819 FETAL BIOPHYS PROFIL W/O NST: CPT

## 2022-11-11 PROCEDURE — 76816 OB US FOLLOW-UP PER FETUS: CPT | Mod: 59

## 2022-11-11 PROCEDURE — 76820 UMBILICAL ARTERY ECHO: CPT | Mod: 59

## 2022-12-09 ENCOUNTER — APPOINTMENT (OUTPATIENT)
Dept: ANTEPARTUM | Facility: CLINIC | Age: 37
End: 2022-12-09

## 2022-12-09 ENCOUNTER — ASOB RESULT (OUTPATIENT)
Age: 37
End: 2022-12-09

## 2022-12-09 PROCEDURE — 76819 FETAL BIOPHYS PROFIL W/O NST: CPT

## 2022-12-09 PROCEDURE — 76820 UMBILICAL ARTERY ECHO: CPT | Mod: 59

## 2022-12-09 PROCEDURE — 76816 OB US FOLLOW-UP PER FETUS: CPT

## 2023-01-06 ENCOUNTER — APPOINTMENT (OUTPATIENT)
Dept: ANTEPARTUM | Facility: CLINIC | Age: 38
End: 2023-01-06
Payer: MEDICAID

## 2023-01-06 ENCOUNTER — ASOB RESULT (OUTPATIENT)
Age: 38
End: 2023-01-06

## 2023-01-06 PROCEDURE — 76816 OB US FOLLOW-UP PER FETUS: CPT

## 2023-01-06 PROCEDURE — 76819 FETAL BIOPHYS PROFIL W/O NST: CPT

## 2023-01-19 ENCOUNTER — INPATIENT (INPATIENT)
Facility: HOSPITAL | Age: 38
LOS: 3 days | Discharge: HOME | End: 2023-01-23
Attending: OBSTETRICS & GYNECOLOGY | Admitting: OBSTETRICS & GYNECOLOGY
Payer: MEDICAID

## 2023-01-19 ENCOUNTER — TRANSCRIPTION ENCOUNTER (OUTPATIENT)
Age: 38
End: 2023-01-19

## 2023-01-19 VITALS — HEIGHT: 66 IN | WEIGHT: 190.92 LBS

## 2023-01-19 LAB
ANISOCYTOSIS BLD QL: SLIGHT — SIGNIFICANT CHANGE UP
BASOPHILS # BLD AUTO: 0 K/UL — SIGNIFICANT CHANGE UP (ref 0–0.2)
BASOPHILS NFR BLD AUTO: 0 % — SIGNIFICANT CHANGE UP (ref 0–2)
BLD GP AB SCN SERPL QL: NEGATIVE — SIGNIFICANT CHANGE UP
EOSINOPHIL # BLD AUTO: 0 K/UL — SIGNIFICANT CHANGE UP (ref 0–0.5)
EOSINOPHIL NFR BLD AUTO: 0 % — SIGNIFICANT CHANGE UP (ref 0–6)
GIANT PLATELETS BLD QL SMEAR: PRESENT — SIGNIFICANT CHANGE UP
HBV SURFACE AG SER-ACNC: SIGNIFICANT CHANGE UP
HCT VFR BLD CALC: 36.3 % — SIGNIFICANT CHANGE UP (ref 34.5–45)
HGB BLD-MCNC: 12.9 G/DL — SIGNIFICANT CHANGE UP (ref 11.5–15.5)
HIV 1+2 AB+HIV1 P24 AG SERPL QL IA: SIGNIFICANT CHANGE UP
LYMPHOCYTES # BLD AUTO: 0.78 K/UL — LOW (ref 1–3.3)
LYMPHOCYTES # BLD AUTO: 8 % — LOW (ref 13–44)
MACROCYTES BLD QL: SLIGHT — SIGNIFICANT CHANGE UP
MANUAL SMEAR VERIFICATION: SIGNIFICANT CHANGE UP
MCHC RBC-ENTMCNC: 30.7 PG — SIGNIFICANT CHANGE UP (ref 27–34)
MCHC RBC-ENTMCNC: 35.5 GM/DL — SIGNIFICANT CHANGE UP (ref 32–36)
MCV RBC AUTO: 86.4 FL — SIGNIFICANT CHANGE UP (ref 80–100)
MONOCYTES # BLD AUTO: 0.18 K/UL — SIGNIFICANT CHANGE UP (ref 0–0.9)
MONOCYTES NFR BLD AUTO: 1.8 % — LOW (ref 2–14)
NEUTROPHILS # BLD AUTO: 8.44 K/UL — HIGH (ref 1.8–7.4)
NEUTROPHILS NFR BLD AUTO: 86.7 % — HIGH (ref 43–77)
PLAT MORPH BLD: NORMAL — SIGNIFICANT CHANGE UP
PLATELET # BLD AUTO: 108 K/UL — LOW (ref 150–400)
POIKILOCYTOSIS BLD QL AUTO: SLIGHT — SIGNIFICANT CHANGE UP
POLYCHROMASIA BLD QL SMEAR: SLIGHT — SIGNIFICANT CHANGE UP
RBC # BLD: 4.2 M/UL — SIGNIFICANT CHANGE UP (ref 3.8–5.2)
RBC # FLD: 15.6 % — HIGH (ref 10.3–14.5)
RBC BLD AUTO: ABNORMAL
RH IG SCN BLD-IMP: POSITIVE — SIGNIFICANT CHANGE UP
RH IG SCN BLD-IMP: POSITIVE — SIGNIFICANT CHANGE UP
SARS-COV-2 RNA SPEC QL NAA+PROBE: SIGNIFICANT CHANGE UP
SMUDGE CELLS # BLD: PRESENT — SIGNIFICANT CHANGE UP
SPHEROCYTES BLD QL SMEAR: SLIGHT — SIGNIFICANT CHANGE UP
VARIANT LYMPHS # BLD: 3.5 % — SIGNIFICANT CHANGE UP (ref 0–6)
WBC # BLD: 9.73 K/UL — SIGNIFICANT CHANGE UP (ref 3.8–10.5)
WBC # FLD AUTO: 9.73 K/UL — SIGNIFICANT CHANGE UP (ref 3.8–10.5)

## 2023-01-19 RX ORDER — CITRIC ACID/SODIUM CITRATE 300-500 MG
15 SOLUTION, ORAL ORAL EVERY 6 HOURS
Refills: 0 | Status: DISCONTINUED | OUTPATIENT
Start: 2023-01-19 | End: 2023-01-20

## 2023-01-19 RX ORDER — OXYTOCIN 10 UNIT/ML
333.33 VIAL (ML) INJECTION
Qty: 20 | Refills: 0 | Status: DISCONTINUED | OUTPATIENT
Start: 2023-01-19 | End: 2023-01-20

## 2023-01-19 RX ORDER — SODIUM CHLORIDE 9 MG/ML
1000 INJECTION, SOLUTION INTRAVENOUS
Refills: 0 | Status: DISCONTINUED | OUTPATIENT
Start: 2023-01-19 | End: 2023-01-20

## 2023-01-19 RX ORDER — CHLORHEXIDINE GLUCONATE 213 G/1000ML
1 SOLUTION TOPICAL ONCE
Refills: 0 | Status: COMPLETED | OUTPATIENT
Start: 2023-01-19 | End: 2023-01-20

## 2023-01-19 RX ORDER — OXYTOCIN 10 UNIT/ML
2 VIAL (ML) INJECTION
Qty: 30 | Refills: 0 | Status: DISCONTINUED | OUTPATIENT
Start: 2023-01-19 | End: 2023-01-20

## 2023-01-19 RX ORDER — FENTANYL/BUPIVACAINE/NS/PF 2MCG/ML-.1
250 PLASTIC BAG, INJECTION (ML) INJECTION
Refills: 0 | Status: DISCONTINUED | OUTPATIENT
Start: 2023-01-19 | End: 2023-01-20

## 2023-01-19 RX ADMIN — SODIUM CHLORIDE 125 MILLILITER(S): 9 INJECTION, SOLUTION INTRAVENOUS at 19:18

## 2023-01-19 RX ADMIN — SODIUM CHLORIDE 125 MILLILITER(S): 9 INJECTION, SOLUTION INTRAVENOUS at 21:37

## 2023-01-19 RX ADMIN — Medication 2 MILLIUNIT(S)/MIN: at 23:02

## 2023-01-19 NOTE — PRE-ANESTHESIA EVALUATION ADULT - NSANTHPMHFT_GEN_ALL_CORE
Pt is a 36yo  @ 30w0d presenting for IOL due to unstable lie. Patient is feeling well, no contractions, no LOF/VB. + FM.     ANTE: spontaneous. anatomy WNL. NIPT done. Mother and Father both carriers for Sickle Cell trait, declined amniocentesis. Passed GCT. No HTN or Thyroid issues in pregnancy. GBS neg. EFW: 3600g. HC 97th%.     Obhx: G1 - 2005: VTOP D&C   G2 - current  Gynhx: Denies cysts, 3 fibroids according to pt - 1 is subserosal, GERHARD/Posterior 6.52x7.24x7.68; no STDs or herpes. no abnl paps.   Mhx: Sickle cell trait   shx: L salpingectomy in  due to infection  meds: PNV, iron   All: NKDA     Physical Exam:   VS - BP: 125/87 HR 97

## 2023-01-19 NOTE — PATIENT PROFILE OB - HEIGHT IN INCHES
Refill request for oxycodone    Last OV was 12/4/17  Last refill was 2/7/18 disp 72 refills 0    Refill sent to the MD per protocol.      6

## 2023-01-20 LAB
ALBUMIN SERPL ELPH-MCNC: 3.7 G/DL — SIGNIFICANT CHANGE UP (ref 3.3–5)
ALP SERPL-CCNC: 144 U/L — HIGH (ref 40–120)
ALT FLD-CCNC: 26 U/L — SIGNIFICANT CHANGE UP (ref 10–45)
ANION GAP SERPL CALC-SCNC: 11 MMOL/L — SIGNIFICANT CHANGE UP (ref 5–17)
AST SERPL-CCNC: 27 U/L — SIGNIFICANT CHANGE UP (ref 10–40)
BASOPHILS # BLD AUTO: 0.05 K/UL — SIGNIFICANT CHANGE UP (ref 0–0.2)
BASOPHILS NFR BLD AUTO: 0.4 % — SIGNIFICANT CHANGE UP (ref 0–2)
BILIRUB SERPL-MCNC: 1.2 MG/DL — SIGNIFICANT CHANGE UP (ref 0.2–1.2)
BUN SERPL-MCNC: 7 MG/DL — SIGNIFICANT CHANGE UP (ref 7–23)
CALCIUM SERPL-MCNC: 9.3 MG/DL — SIGNIFICANT CHANGE UP (ref 8.4–10.5)
CHLORIDE SERPL-SCNC: 102 MMOL/L — SIGNIFICANT CHANGE UP (ref 96–108)
CO2 SERPL-SCNC: 22 MMOL/L — SIGNIFICANT CHANGE UP (ref 22–31)
COVID-19 SPIKE DOMAIN AB INTERP: POSITIVE
COVID-19 SPIKE DOMAIN ANTIBODY RESULT: >250 U/ML — HIGH
CREAT ?TM UR-MCNC: 87 MG/DL — SIGNIFICANT CHANGE UP
CREAT SERPL-MCNC: 0.85 MG/DL — SIGNIFICANT CHANGE UP (ref 0.5–1.3)
EGFR: 91 ML/MIN/1.73M2 — SIGNIFICANT CHANGE UP
EOSINOPHIL # BLD AUTO: 0.04 K/UL — SIGNIFICANT CHANGE UP (ref 0–0.5)
EOSINOPHIL NFR BLD AUTO: 0.3 % — SIGNIFICANT CHANGE UP (ref 0–6)
FIBRINOGEN PPP-MCNC: 476 MG/DL — HIGH (ref 258–438)
GLUCOSE SERPL-MCNC: 97 MG/DL — SIGNIFICANT CHANGE UP (ref 70–99)
HCT VFR BLD CALC: 35.4 % — SIGNIFICANT CHANGE UP (ref 34.5–45)
HGB BLD-MCNC: 12.4 G/DL — SIGNIFICANT CHANGE UP (ref 11.5–15.5)
IMM GRANULOCYTES NFR BLD AUTO: 0.4 % — SIGNIFICANT CHANGE UP (ref 0–0.9)
LDH SERPL L TO P-CCNC: 155 U/L — SIGNIFICANT CHANGE UP (ref 50–242)
LYMPHOCYTES # BLD AUTO: 1.91 K/UL — SIGNIFICANT CHANGE UP (ref 1–3.3)
LYMPHOCYTES # BLD AUTO: 16.7 % — SIGNIFICANT CHANGE UP (ref 13–44)
MCHC RBC-ENTMCNC: 30.3 PG — SIGNIFICANT CHANGE UP (ref 27–34)
MCHC RBC-ENTMCNC: 35 GM/DL — SIGNIFICANT CHANGE UP (ref 32–36)
MCV RBC AUTO: 86.6 FL — SIGNIFICANT CHANGE UP (ref 80–100)
MONOCYTES # BLD AUTO: 0.71 K/UL — SIGNIFICANT CHANGE UP (ref 0–0.9)
MONOCYTES NFR BLD AUTO: 6.2 % — SIGNIFICANT CHANGE UP (ref 2–14)
NEUTROPHILS # BLD AUTO: 8.68 K/UL — HIGH (ref 1.8–7.4)
NEUTROPHILS NFR BLD AUTO: 76 % — SIGNIFICANT CHANGE UP (ref 43–77)
NRBC # BLD: 0 /100 WBCS — SIGNIFICANT CHANGE UP (ref 0–0)
PLATELET # BLD AUTO: 103 K/UL — LOW (ref 150–400)
POTASSIUM SERPL-MCNC: 4.5 MMOL/L — SIGNIFICANT CHANGE UP (ref 3.5–5.3)
POTASSIUM SERPL-SCNC: 4.5 MMOL/L — SIGNIFICANT CHANGE UP (ref 3.5–5.3)
PROT ?TM UR-MCNC: 16 MG/DL — HIGH (ref 0–12)
PROT SERPL-MCNC: 6.9 G/DL — SIGNIFICANT CHANGE UP (ref 6–8.3)
PROT/CREAT UR-RTO: 0.2 RATIO — SIGNIFICANT CHANGE UP (ref 0–0.2)
RBC # BLD: 4.09 M/UL — SIGNIFICANT CHANGE UP (ref 3.8–5.2)
RBC # FLD: 15.6 % — HIGH (ref 10.3–14.5)
SARS-COV-2 IGG+IGM SERPL QL IA: >250 U/ML — HIGH
SARS-COV-2 IGG+IGM SERPL QL IA: POSITIVE
SODIUM SERPL-SCNC: 135 MMOL/L — SIGNIFICANT CHANGE UP (ref 135–145)
T PALLIDUM AB TITR SER: NEGATIVE — SIGNIFICANT CHANGE UP
URATE SERPL-MCNC: 6.7 MG/DL — SIGNIFICANT CHANGE UP (ref 2.5–7)
WBC # BLD: 11.44 K/UL — HIGH (ref 3.8–10.5)
WBC # FLD AUTO: 11.44 K/UL — HIGH (ref 3.8–10.5)

## 2023-01-20 PROCEDURE — 88307 TISSUE EXAM BY PATHOLOGIST: CPT | Mod: 26

## 2023-01-20 RX ORDER — ACETAMINOPHEN 500 MG
975 TABLET ORAL
Refills: 0 | Status: DISCONTINUED | OUTPATIENT
Start: 2023-01-20 | End: 2023-01-23

## 2023-01-20 RX ORDER — SODIUM CHLORIDE 9 MG/ML
1000 INJECTION, SOLUTION INTRAVENOUS
Refills: 0 | Status: DISCONTINUED | OUTPATIENT
Start: 2023-01-20 | End: 2023-01-23

## 2023-01-20 RX ORDER — AZITHROMYCIN 500 MG/1
500 TABLET, FILM COATED ORAL ONCE
Refills: 0 | Status: DISCONTINUED | OUTPATIENT
Start: 2023-01-20 | End: 2023-01-20

## 2023-01-20 RX ORDER — DIPHENHYDRAMINE HCL 50 MG
25 CAPSULE ORAL EVERY 6 HOURS
Refills: 0 | Status: DISCONTINUED | OUTPATIENT
Start: 2023-01-20 | End: 2023-01-23

## 2023-01-20 RX ORDER — KETOROLAC TROMETHAMINE 30 MG/ML
30 SYRINGE (ML) INJECTION EVERY 6 HOURS
Refills: 0 | Status: DISCONTINUED | OUTPATIENT
Start: 2023-01-20 | End: 2023-01-21

## 2023-01-20 RX ORDER — LANOLIN
1 OINTMENT (GRAM) TOPICAL EVERY 6 HOURS
Refills: 0 | Status: DISCONTINUED | OUTPATIENT
Start: 2023-01-20 | End: 2023-01-23

## 2023-01-20 RX ORDER — CEFAZOLIN SODIUM 1 G
2000 VIAL (EA) INJECTION ONCE
Refills: 0 | Status: COMPLETED | OUTPATIENT
Start: 2023-01-20 | End: 2023-01-20

## 2023-01-20 RX ORDER — ENOXAPARIN SODIUM 100 MG/ML
40 INJECTION SUBCUTANEOUS EVERY 24 HOURS
Refills: 0 | Status: DISCONTINUED | OUTPATIENT
Start: 2023-01-21 | End: 2023-01-23

## 2023-01-20 RX ORDER — ACETAMINOPHEN 500 MG
1000 TABLET ORAL ONCE
Refills: 0 | Status: COMPLETED | OUTPATIENT
Start: 2023-01-20 | End: 2023-01-20

## 2023-01-20 RX ORDER — SIMETHICONE 80 MG/1
80 TABLET, CHEWABLE ORAL EVERY 4 HOURS
Refills: 0 | Status: DISCONTINUED | OUTPATIENT
Start: 2023-01-20 | End: 2023-01-23

## 2023-01-20 RX ORDER — CITRIC ACID/SODIUM CITRATE 300-500 MG
30 SOLUTION, ORAL ORAL ONCE
Refills: 0 | Status: DISCONTINUED | OUTPATIENT
Start: 2023-01-20 | End: 2023-01-20

## 2023-01-20 RX ORDER — OXYTOCIN 10 UNIT/ML
41.67 VIAL (ML) INJECTION
Qty: 20 | Refills: 0 | Status: DISCONTINUED | OUTPATIENT
Start: 2023-01-20 | End: 2023-01-20

## 2023-01-20 RX ORDER — OXYCODONE HYDROCHLORIDE 5 MG/1
5 TABLET ORAL ONCE
Refills: 0 | Status: DISCONTINUED | OUTPATIENT
Start: 2023-01-20 | End: 2023-01-23

## 2023-01-20 RX ORDER — MAGNESIUM HYDROXIDE 400 MG/1
30 TABLET, CHEWABLE ORAL
Refills: 0 | Status: DISCONTINUED | OUTPATIENT
Start: 2023-01-20 | End: 2023-01-23

## 2023-01-20 RX ORDER — OXYCODONE HYDROCHLORIDE 5 MG/1
5 TABLET ORAL
Refills: 0 | Status: DISCONTINUED | OUTPATIENT
Start: 2023-01-20 | End: 2023-01-23

## 2023-01-20 RX ORDER — OXYTOCIN 10 UNIT/ML
333.33 VIAL (ML) INJECTION
Qty: 20 | Refills: 0 | Status: DISCONTINUED | OUTPATIENT
Start: 2023-01-20 | End: 2023-01-23

## 2023-01-20 RX ORDER — TETANUS TOXOID, REDUCED DIPHTHERIA TOXOID AND ACELLULAR PERTUSSIS VACCINE, ADSORBED 5; 2.5; 8; 8; 2.5 [IU]/.5ML; [IU]/.5ML; UG/.5ML; UG/.5ML; UG/.5ML
0.5 SUSPENSION INTRAMUSCULAR ONCE
Refills: 0 | Status: DISCONTINUED | OUTPATIENT
Start: 2023-01-20 | End: 2023-01-23

## 2023-01-20 RX ORDER — SODIUM CHLORIDE 9 MG/ML
1000 INJECTION, SOLUTION INTRAVENOUS
Refills: 0 | Status: DISCONTINUED | OUTPATIENT
Start: 2023-01-20 | End: 2023-01-20

## 2023-01-20 RX ORDER — SODIUM CHLORIDE 9 MG/ML
300 INJECTION, SOLUTION INTRAVENOUS ONCE
Refills: 0 | Status: DISCONTINUED | OUTPATIENT
Start: 2023-01-20 | End: 2023-01-20

## 2023-01-20 RX ORDER — IBUPROFEN 200 MG
600 TABLET ORAL EVERY 6 HOURS
Refills: 0 | Status: COMPLETED | OUTPATIENT
Start: 2023-01-20 | End: 2023-12-19

## 2023-01-20 RX ADMIN — Medication 975 MILLIGRAM(S): at 20:59

## 2023-01-20 RX ADMIN — Medication 30 MILLIGRAM(S): at 12:39

## 2023-01-20 RX ADMIN — SODIUM CHLORIDE 125 MILLILITER(S): 9 INJECTION, SOLUTION INTRAVENOUS at 08:46

## 2023-01-20 RX ADMIN — Medication 30 MILLIGRAM(S): at 18:17

## 2023-01-20 RX ADMIN — SIMETHICONE 80 MILLIGRAM(S): 80 TABLET, CHEWABLE ORAL at 18:16

## 2023-01-20 RX ADMIN — Medication 100 MILLIGRAM(S): at 11:09

## 2023-01-20 RX ADMIN — Medication 400 MILLIGRAM(S): at 15:13

## 2023-01-20 RX ADMIN — Medication 0.2 MILLIGRAM(S): at 15:14

## 2023-01-20 RX ADMIN — Medication 30 MILLIGRAM(S): at 18:56

## 2023-01-20 RX ADMIN — Medication 1000 MILLIUNIT(S)/MIN: at 12:45

## 2023-01-20 RX ADMIN — SODIUM CHLORIDE 125 MILLILITER(S): 9 INJECTION, SOLUTION INTRAVENOUS at 19:34

## 2023-01-20 RX ADMIN — CHLORHEXIDINE GLUCONATE 1 APPLICATION(S): 213 SOLUTION TOPICAL at 11:10

## 2023-01-20 RX ADMIN — Medication 1 APPLICATION(S): at 20:59

## 2023-01-20 RX ADMIN — SODIUM CHLORIDE 125 MILLILITER(S): 9 INJECTION, SOLUTION INTRAVENOUS at 00:47

## 2023-01-20 RX ADMIN — Medication 975 MILLIGRAM(S): at 21:45

## 2023-01-21 LAB
ALBUMIN SERPL ELPH-MCNC: 2.8 G/DL — LOW (ref 3.3–5)
ALBUMIN SERPL ELPH-MCNC: 3 G/DL — LOW (ref 3.3–5)
ALP SERPL-CCNC: 111 U/L — SIGNIFICANT CHANGE UP (ref 40–120)
ALP SERPL-CCNC: 155 U/L — HIGH (ref 40–120)
ALT FLD-CCNC: 20 U/L — SIGNIFICANT CHANGE UP (ref 10–45)
ALT FLD-CCNC: 21 U/L — SIGNIFICANT CHANGE UP (ref 10–45)
ANION GAP SERPL CALC-SCNC: 10 MMOL/L — SIGNIFICANT CHANGE UP (ref 5–17)
ANION GAP SERPL CALC-SCNC: 10 MMOL/L — SIGNIFICANT CHANGE UP (ref 5–17)
AST SERPL-CCNC: 23 U/L — SIGNIFICANT CHANGE UP (ref 10–40)
AST SERPL-CCNC: 30 U/L — SIGNIFICANT CHANGE UP (ref 10–40)
BASOPHILS # BLD AUTO: 0.06 K/UL — SIGNIFICANT CHANGE UP (ref 0–0.2)
BASOPHILS NFR BLD AUTO: 0.3 % — SIGNIFICANT CHANGE UP (ref 0–2)
BILIRUB SERPL-MCNC: 0.8 MG/DL — SIGNIFICANT CHANGE UP (ref 0.2–1.2)
BILIRUB SERPL-MCNC: 0.9 MG/DL — SIGNIFICANT CHANGE UP (ref 0.2–1.2)
BUN SERPL-MCNC: 7 MG/DL — SIGNIFICANT CHANGE UP (ref 7–23)
BUN SERPL-MCNC: 7 MG/DL — SIGNIFICANT CHANGE UP (ref 7–23)
CALCIUM SERPL-MCNC: 8.1 MG/DL — LOW (ref 8.4–10.5)
CALCIUM SERPL-MCNC: 9 MG/DL — SIGNIFICANT CHANGE UP (ref 8.4–10.5)
CHLORIDE SERPL-SCNC: 102 MMOL/L — SIGNIFICANT CHANGE UP (ref 96–108)
CHLORIDE SERPL-SCNC: 103 MMOL/L — SIGNIFICANT CHANGE UP (ref 96–108)
CO2 SERPL-SCNC: 23 MMOL/L — SIGNIFICANT CHANGE UP (ref 22–31)
CO2 SERPL-SCNC: 23 MMOL/L — SIGNIFICANT CHANGE UP (ref 22–31)
CREAT SERPL-MCNC: 0.8 MG/DL — SIGNIFICANT CHANGE UP (ref 0.5–1.3)
CREAT SERPL-MCNC: 0.85 MG/DL — SIGNIFICANT CHANGE UP (ref 0.5–1.3)
EGFR: 91 ML/MIN/1.73M2 — SIGNIFICANT CHANGE UP
EGFR: 98 ML/MIN/1.73M2 — SIGNIFICANT CHANGE UP
EOSINOPHIL # BLD AUTO: 0.02 K/UL — SIGNIFICANT CHANGE UP (ref 0–0.5)
EOSINOPHIL NFR BLD AUTO: 0.1 % — SIGNIFICANT CHANGE UP (ref 0–6)
GLUCOSE SERPL-MCNC: 140 MG/DL — HIGH (ref 70–99)
GLUCOSE SERPL-MCNC: 153 MG/DL — HIGH (ref 70–99)
HCT VFR BLD CALC: 29.2 % — LOW (ref 34.5–45)
HCT VFR BLD CALC: 31.1 % — LOW (ref 34.5–45)
HGB BLD-MCNC: 10.1 G/DL — LOW (ref 11.5–15.5)
HGB BLD-MCNC: 10.8 G/DL — LOW (ref 11.5–15.5)
IMM GRANULOCYTES NFR BLD AUTO: 0.5 % — SIGNIFICANT CHANGE UP (ref 0–0.9)
LYMPHOCYTES # BLD AUTO: 11.6 % — LOW (ref 13–44)
LYMPHOCYTES # BLD AUTO: 2.34 K/UL — SIGNIFICANT CHANGE UP (ref 1–3.3)
MAGNESIUM SERPL-MCNC: 5.4 MG/DL — HIGH (ref 1.6–2.6)
MCHC RBC-ENTMCNC: 29.8 PG — SIGNIFICANT CHANGE UP (ref 27–34)
MCHC RBC-ENTMCNC: 30.5 PG — SIGNIFICANT CHANGE UP (ref 27–34)
MCHC RBC-ENTMCNC: 34.6 GM/DL — SIGNIFICANT CHANGE UP (ref 32–36)
MCHC RBC-ENTMCNC: 34.7 GM/DL — SIGNIFICANT CHANGE UP (ref 32–36)
MCV RBC AUTO: 86.1 FL — SIGNIFICANT CHANGE UP (ref 80–100)
MCV RBC AUTO: 87.9 FL — SIGNIFICANT CHANGE UP (ref 80–100)
MONOCYTES # BLD AUTO: 1.25 K/UL — HIGH (ref 0–0.9)
MONOCYTES NFR BLD AUTO: 6.2 % — SIGNIFICANT CHANGE UP (ref 2–14)
NEUTROPHILS # BLD AUTO: 16.36 K/UL — HIGH (ref 1.8–7.4)
NEUTROPHILS NFR BLD AUTO: 81.3 % — HIGH (ref 43–77)
NRBC # BLD: 0 /100 WBCS — SIGNIFICANT CHANGE UP (ref 0–0)
NRBC # BLD: 0 /100 WBCS — SIGNIFICANT CHANGE UP (ref 0–0)
PLATELET # BLD AUTO: 105 K/UL — LOW (ref 150–400)
PLATELET # BLD AUTO: 98 K/UL — LOW (ref 150–400)
POTASSIUM SERPL-MCNC: 3.9 MMOL/L — SIGNIFICANT CHANGE UP (ref 3.5–5.3)
POTASSIUM SERPL-MCNC: 4.2 MMOL/L — SIGNIFICANT CHANGE UP (ref 3.5–5.3)
POTASSIUM SERPL-SCNC: 3.9 MMOL/L — SIGNIFICANT CHANGE UP (ref 3.5–5.3)
POTASSIUM SERPL-SCNC: 4.2 MMOL/L — SIGNIFICANT CHANGE UP (ref 3.5–5.3)
PROT SERPL-MCNC: 5.7 G/DL — LOW (ref 6–8.3)
PROT SERPL-MCNC: 5.8 G/DL — LOW (ref 6–8.3)
RBC # BLD: 3.39 M/UL — LOW (ref 3.8–5.2)
RBC # BLD: 3.54 M/UL — LOW (ref 3.8–5.2)
RBC # FLD: 15.7 % — HIGH (ref 10.3–14.5)
RBC # FLD: 15.8 % — HIGH (ref 10.3–14.5)
SODIUM SERPL-SCNC: 135 MMOL/L — SIGNIFICANT CHANGE UP (ref 135–145)
SODIUM SERPL-SCNC: 136 MMOL/L — SIGNIFICANT CHANGE UP (ref 135–145)
WBC # BLD: 17.18 K/UL — HIGH (ref 3.8–10.5)
WBC # BLD: 20.14 K/UL — HIGH (ref 3.8–10.5)
WBC # FLD AUTO: 17.18 K/UL — HIGH (ref 3.8–10.5)
WBC # FLD AUTO: 20.14 K/UL — HIGH (ref 3.8–10.5)

## 2023-01-21 RX ORDER — IBUPROFEN 200 MG
600 TABLET ORAL EVERY 6 HOURS
Refills: 0 | Status: DISCONTINUED | OUTPATIENT
Start: 2023-01-21 | End: 2023-01-23

## 2023-01-21 RX ORDER — MAGNESIUM SULFATE 500 MG/ML
4 VIAL (ML) INJECTION ONCE
Refills: 0 | Status: COMPLETED | OUTPATIENT
Start: 2023-01-21 | End: 2023-01-21

## 2023-01-21 RX ORDER — SODIUM CHLORIDE 9 MG/ML
1000 INJECTION, SOLUTION INTRAVENOUS
Refills: 0 | Status: DISCONTINUED | OUTPATIENT
Start: 2023-01-21 | End: 2023-01-23

## 2023-01-21 RX ORDER — MAGNESIUM SULFATE 500 MG/ML
1 VIAL (ML) INJECTION
Qty: 40 | Refills: 0 | Status: DISCONTINUED | OUTPATIENT
Start: 2023-01-21 | End: 2023-01-22

## 2023-01-21 RX ADMIN — Medication 30 MILLIGRAM(S): at 01:10

## 2023-01-21 RX ADMIN — Medication 600 MILLIGRAM(S): at 06:38

## 2023-01-21 RX ADMIN — Medication 975 MILLIGRAM(S): at 09:45

## 2023-01-21 RX ADMIN — Medication 50 GM/HR: at 12:45

## 2023-01-21 RX ADMIN — Medication 975 MILLIGRAM(S): at 21:13

## 2023-01-21 RX ADMIN — SODIUM CHLORIDE 125 MILLILITER(S): 9 INJECTION, SOLUTION INTRAVENOUS at 12:28

## 2023-01-21 RX ADMIN — ENOXAPARIN SODIUM 40 MILLIGRAM(S): 100 INJECTION SUBCUTANEOUS at 00:42

## 2023-01-21 RX ADMIN — Medication 600 MILLIGRAM(S): at 19:11

## 2023-01-21 RX ADMIN — Medication 30 MILLIGRAM(S): at 00:42

## 2023-01-21 RX ADMIN — Medication 600 MILLIGRAM(S): at 12:26

## 2023-01-21 RX ADMIN — Medication 975 MILLIGRAM(S): at 08:54

## 2023-01-21 RX ADMIN — Medication 600 MILLIGRAM(S): at 18:00

## 2023-01-21 RX ADMIN — Medication 975 MILLIGRAM(S): at 03:13

## 2023-01-21 RX ADMIN — Medication 300 GRAM(S): at 12:28

## 2023-01-21 RX ADMIN — Medication 975 MILLIGRAM(S): at 02:13

## 2023-01-21 RX ADMIN — SIMETHICONE 80 MILLIGRAM(S): 80 TABLET, CHEWABLE ORAL at 08:54

## 2023-01-21 NOTE — CHART NOTE - NSCHARTNOTEFT_GEN_A_CORE
Patient evaluated at bedside for clinical magnesium check. Serum magnesium to be drawn at 18:30.  She denies visual disturbances including scotoma, headache and right upper quadrant pain. Also denies vomiting/epigastric pain/shortness of breath. Pain well controlled. Reports mild nausea and dizziness. Denies medication.      T(C): 36.7 (01-21-23 @ 18:30), Max: 36.7 (01-21-23 @ 10:11)  HR: 88 (01-21-23 @ 18:30) (83 - 98)  BP: 115/71 (01-21-23 @ 18:30) (107/67 - 123/79)  RR: 17 (01-21-23 @ 18:30) (17 - 18)  SpO2: 98% (01-21-23 @ 18:30) (96% - 98%)  Wt(kg): --  Daily     Daily     01-21 @ 07:01  -  01-21 @ 19:31  --------------------------------------------------------  IN: 875 mL / OUT: 4825 mL / NET: -3950 mL      Gen: NAD, AAOx3  CV: RRR, no M/R/G  Pulm: CTAB, no R/R/W  Abd: soft, nontender, no rebound or guarding, no epigastric tenderness, liver nonpalpable +BS, fundus palpated   : Peñaloza in place  Ext: +1 edema mendez, SCDs in place, Reflexes ___                          10.1   17.18 )-----------( 105      ( 21 Jan 2023 17:44 )             29.2     01-21    135  |  102  |  7   ----------------------------<  153<H>  4.2   |  23  |  0.85    Ca    8.1<L>      21 Jan 2023 17:44  Mg     5.4     01-21    TPro  5.8<L>  /  Alb  3.0<L>  /  TBili  0.8  /  DBili  x   /  AST  30  /  ALT  21  /  AlkPhos  155<H>  01-21    acetaminophen     Tablet .. 975 milliGRAM(s) Oral <User Schedule>  diphenhydrAMINE 25 milliGRAM(s) Oral every 6 hours PRN  diphtheria/tetanus/pertussis (acellular) Vaccine (Adacel) 0.5 milliLiter(s) IntraMuscular once  enoxaparin Injectable 40 milliGRAM(s) SubCutaneous every 24 hours  ibuprofen  Tablet. 600 milliGRAM(s) Oral every 6 hours  lactated ringers. 1000 milliLiter(s) IV Continuous <Continuous>  lactated ringers. 1000 milliLiter(s) IV Continuous <Continuous>  lanolin Ointment 1 Application(s) Topical every 6 hours PRN  magnesium hydroxide Suspension 30 milliLiter(s) Oral two times a day PRN  magnesium sulfate Infusion 2 Gm/Hr IV Continuous <Continuous>  oxyCODONE    IR 5 milliGRAM(s) Oral every 3 hours PRN  oxyCODONE    IR 5 milliGRAM(s) Oral once PRN  oxytocin Infusion 333.333 milliUNIT(s)/Min IV Continuous <Continuous>  simethicone 80 milliGRAM(s) Chew every 4 hours PRN      01-20-23 @ 07:01  -  01-21-23 @ 07:00  --------------------------------------------------------  IN: 500 mL / OUT: 1500 mL / NET: -1000 mL    01-21-23 @ 07:01 - 01-21-23 @ 19:31  --------------------------------------------------------  IN: 875 mL / OUT: 4825 mL / NET: -3950 mL      A&P:  37y s/p pCS complicated by preeclampsia with severe features    1. Preeclampsia: Continue IV Magnesium @2G/hr for 24 hrs post delivery.  No complaints currently.   Antihypertensives: none  Continue to monitor blood pressures  Next Magnesium check @ 00:30  Follow up on Magnesium serum level     2. GI: Regular diet    3. : strict Is and Os, voiding freely Patient evaluated at bedside for clinical magnesium check. Serum magnesium to be drawn at 18:30.  She denies visual disturbances including scotoma, headache and right upper quadrant pain. Also denies vomiting/epigastric pain/shortness of breath. Pain well controlled. Reports mild nausea and dizziness. Denies medication.      T(C): 36.7 (01-21-23 @ 18:30), Max: 36.7 (01-21-23 @ 10:11)  HR: 88 (01-21-23 @ 18:30) (83 - 98)  BP: 115/71 (01-21-23 @ 18:30) (107/67 - 123/79)  RR: 17 (01-21-23 @ 18:30) (17 - 18)  SpO2: 98% (01-21-23 @ 18:30) (96% - 98%)  Wt(kg): --  Daily     Daily     01-21 @ 07:01  -  01-21 @ 19:31  --------------------------------------------------------  IN: 875 mL / OUT: 4825 mL / NET: -3950 mL      Gen: NAD, AAOx3  CV: RRR, no M/R/G  Pulm: CTAB, no R/R/W  Abd: soft, nontender, no rebound or guarding, no epigastric tenderness, liver nonpalpable +BS, fundus palpated   : Page in place  Ext: +1 edema mendez, SCDs in place, Reflexes ___                          10.1   17.18 )-----------( 105      ( 21 Jan 2023 17:44 )             29.2     01-21    135  |  102  |  7   ----------------------------<  153<H>  4.2   |  23  |  0.85    Ca    8.1<L>      21 Jan 2023 17:44  Mg     5.4     01-21    TPro  5.8<L>  /  Alb  3.0<L>  /  TBili  0.8  /  DBili  x   /  AST  30  /  ALT  21  /  AlkPhos  155<H>  01-21    acetaminophen     Tablet .. 975 milliGRAM(s) Oral <User Schedule>  diphenhydrAMINE 25 milliGRAM(s) Oral every 6 hours PRN  diphtheria/tetanus/pertussis (acellular) Vaccine (Adacel) 0.5 milliLiter(s) IntraMuscular once  enoxaparin Injectable 40 milliGRAM(s) SubCutaneous every 24 hours  ibuprofen  Tablet. 600 milliGRAM(s) Oral every 6 hours  lactated ringers. 1000 milliLiter(s) IV Continuous <Continuous>  lactated ringers. 1000 milliLiter(s) IV Continuous <Continuous>  lanolin Ointment 1 Application(s) Topical every 6 hours PRN  magnesium hydroxide Suspension 30 milliLiter(s) Oral two times a day PRN  magnesium sulfate Infusion 2 Gm/Hr IV Continuous <Continuous>  oxyCODONE    IR 5 milliGRAM(s) Oral every 3 hours PRN  oxyCODONE    IR 5 milliGRAM(s) Oral once PRN  oxytocin Infusion 333.333 milliUNIT(s)/Min IV Continuous <Continuous>  simethicone 80 milliGRAM(s) Chew every 4 hours PRN      01-20-23 @ 07:01  -  01-21-23 @ 07:00  --------------------------------------------------------  IN: 500 mL / OUT: 1500 mL / NET: -1000 mL    01-21-23 @ 07:01 - 01-21-23 @ 19:31  --------------------------------------------------------  IN: 875 mL / OUT: 4825 mL / NET: -3950 mL      A&P:  37y s/p pCS complicated by preeclampsia with severe features    1. Preeclampsia: Continue IV Magnesium @2G/hr for 24 hrs post delivery.  No complaints currently.   Antihypertensives: none  Continue to monitor blood pressures  Next Magnesium check @ 00:30  Follow up on Magnesium serum level     2. GI: Regular diet    3. : strict Is and Os, page in place Patient evaluated at bedside for clinical magnesium check. Serum magnesium to be drawn at 18:30.  She denies visual disturbances including scotoma, headache and right upper quadrant pain. Also denies vomiting/epigastric pain/shortness of breath. Pain well controlled. Reports mild nausea and dizziness. Denies medication.      T(C): 36.7 (01-21-23 @ 18:30), Max: 36.7 (01-21-23 @ 10:11)  HR: 88 (01-21-23 @ 18:30) (83 - 98)  BP: 115/71 (01-21-23 @ 18:30) (107/67 - 123/79)  RR: 17 (01-21-23 @ 18:30) (17 - 18)  SpO2: 98% (01-21-23 @ 18:30) (96% - 98%)  Wt(kg): --  Daily     Daily     01-21 @ 07:01  -  01-21 @ 19:31  --------------------------------------------------------  IN: 875 mL / OUT: 4825 mL / NET: -3950 mL      Gen: NAD, AAOx3  CV: RRR, no M/R/G  Pulm: CTAB, no R/R/W  Abd: soft, nontender, no rebound or guarding, no epigastric tenderness, liver nonpalpable +BS, fundus palpated   : Page in place  Ext: +1 edema mendez, SCDs in place, Reflexes 2+                          10.1   17.18 )-----------( 105      ( 21 Jan 2023 17:44 )             29.2     01-21    135  |  102  |  7   ----------------------------<  153<H>  4.2   |  23  |  0.85    Ca    8.1<L>      21 Jan 2023 17:44  Mg     5.4     01-21    TPro  5.8<L>  /  Alb  3.0<L>  /  TBili  0.8  /  DBili  x   /  AST  30  /  ALT  21  /  AlkPhos  155<H>  01-21    acetaminophen     Tablet .. 975 milliGRAM(s) Oral <User Schedule>  diphenhydrAMINE 25 milliGRAM(s) Oral every 6 hours PRN  diphtheria/tetanus/pertussis (acellular) Vaccine (Adacel) 0.5 milliLiter(s) IntraMuscular once  enoxaparin Injectable 40 milliGRAM(s) SubCutaneous every 24 hours  ibuprofen  Tablet. 600 milliGRAM(s) Oral every 6 hours  lactated ringers. 1000 milliLiter(s) IV Continuous <Continuous>  lactated ringers. 1000 milliLiter(s) IV Continuous <Continuous>  lanolin Ointment 1 Application(s) Topical every 6 hours PRN  magnesium hydroxide Suspension 30 milliLiter(s) Oral two times a day PRN  magnesium sulfate Infusion 2 Gm/Hr IV Continuous <Continuous>  oxyCODONE    IR 5 milliGRAM(s) Oral every 3 hours PRN  oxyCODONE    IR 5 milliGRAM(s) Oral once PRN  oxytocin Infusion 333.333 milliUNIT(s)/Min IV Continuous <Continuous>  simethicone 80 milliGRAM(s) Chew every 4 hours PRN      01-20-23 @ 07:01 - 01-21-23 @ 07:00  --------------------------------------------------------  IN: 500 mL / OUT: 1500 mL / NET: -1000 mL    01-21-23 @ 07:01 - 01-21-23 @ 19:31  --------------------------------------------------------  IN: 875 mL / OUT: 4825 mL / NET: -3950 mL      A&P:  37y s/p pCS complicated by preeclampsia with severe features    1. Preeclampsia: Continue IV Magnesium @2G/hr for 24 hrs post delivery.  No complaints currently.   Antihypertensives: none  Continue to monitor blood pressures  Next Magnesium check @ 00:30  Follow up on Magnesium serum level     2. GI: Regular diet    3. : strict Is and Os, page in place

## 2023-01-21 NOTE — LACTATION INITIAL EVALUATION - NS LACT CON REASON FOR REQ
dyad seen at approx 26hrs post primary c/s after IOL. BP elevated and Magnesium Sulfate infusion just started. mother states she initially planned to breastfeed, but finds it too difficult and tiring and will now begin to exclusively pump.  primary RN has set up pump and provided instructions. reviewed pump frequency and hands on pumping. answered questions. made aware of LC availability for further support if needed./primaparous mom

## 2023-01-22 LAB
ALBUMIN SERPL ELPH-MCNC: 2.9 G/DL — LOW (ref 3.3–5)
ALP SERPL-CCNC: 115 U/L — SIGNIFICANT CHANGE UP (ref 40–120)
ALT FLD-CCNC: 18 U/L — SIGNIFICANT CHANGE UP (ref 10–45)
ANION GAP SERPL CALC-SCNC: 9 MMOL/L — SIGNIFICANT CHANGE UP (ref 5–17)
AST SERPL-CCNC: 20 U/L — SIGNIFICANT CHANGE UP (ref 10–40)
BILIRUB SERPL-MCNC: 0.4 MG/DL — SIGNIFICANT CHANGE UP (ref 0.2–1.2)
BUN SERPL-MCNC: 10 MG/DL — SIGNIFICANT CHANGE UP (ref 7–23)
CALCIUM SERPL-MCNC: 7.7 MG/DL — LOW (ref 8.4–10.5)
CHLORIDE SERPL-SCNC: 101 MMOL/L — SIGNIFICANT CHANGE UP (ref 96–108)
CO2 SERPL-SCNC: 25 MMOL/L — SIGNIFICANT CHANGE UP (ref 22–31)
CREAT SERPL-MCNC: 0.85 MG/DL — SIGNIFICANT CHANGE UP (ref 0.5–1.3)
EGFR: 91 ML/MIN/1.73M2 — SIGNIFICANT CHANGE UP
GLUCOSE SERPL-MCNC: 108 MG/DL — HIGH (ref 70–99)
HCT VFR BLD CALC: 27.7 % — LOW (ref 34.5–45)
HGB BLD-MCNC: 9.7 G/DL — LOW (ref 11.5–15.5)
MAGNESIUM SERPL-MCNC: 4.6 MG/DL — HIGH (ref 1.6–2.6)
MAGNESIUM SERPL-MCNC: 4.9 MG/DL — HIGH (ref 1.6–2.6)
MCHC RBC-ENTMCNC: 30.2 PG — SIGNIFICANT CHANGE UP (ref 27–34)
MCHC RBC-ENTMCNC: 35 GM/DL — SIGNIFICANT CHANGE UP (ref 32–36)
MCV RBC AUTO: 86.3 FL — SIGNIFICANT CHANGE UP (ref 80–100)
NRBC # BLD: 0 /100 WBCS — SIGNIFICANT CHANGE UP (ref 0–0)
PLATELET # BLD AUTO: 109 K/UL — LOW (ref 150–400)
POTASSIUM SERPL-MCNC: 3.8 MMOL/L — SIGNIFICANT CHANGE UP (ref 3.5–5.3)
POTASSIUM SERPL-SCNC: 3.8 MMOL/L — SIGNIFICANT CHANGE UP (ref 3.5–5.3)
PROT SERPL-MCNC: 5.7 G/DL — LOW (ref 6–8.3)
RBC # BLD: 3.21 M/UL — LOW (ref 3.8–5.2)
RBC # FLD: 15.9 % — HIGH (ref 10.3–14.5)
SODIUM SERPL-SCNC: 135 MMOL/L — SIGNIFICANT CHANGE UP (ref 135–145)
WBC # BLD: 15.12 K/UL — HIGH (ref 3.8–10.5)
WBC # FLD AUTO: 15.12 K/UL — HIGH (ref 3.8–10.5)

## 2023-01-22 RX ADMIN — Medication 975 MILLIGRAM(S): at 15:09

## 2023-01-22 RX ADMIN — Medication 975 MILLIGRAM(S): at 11:00

## 2023-01-22 RX ADMIN — Medication 600 MILLIGRAM(S): at 18:03

## 2023-01-22 RX ADMIN — Medication 600 MILLIGRAM(S): at 13:10

## 2023-01-22 RX ADMIN — Medication 600 MILLIGRAM(S): at 00:20

## 2023-01-22 RX ADMIN — Medication 975 MILLIGRAM(S): at 16:10

## 2023-01-22 RX ADMIN — Medication 600 MILLIGRAM(S): at 06:32

## 2023-01-22 RX ADMIN — Medication 975 MILLIGRAM(S): at 03:16

## 2023-01-22 RX ADMIN — Medication 600 MILLIGRAM(S): at 12:00

## 2023-01-22 RX ADMIN — Medication 975 MILLIGRAM(S): at 22:18

## 2023-01-22 RX ADMIN — Medication 600 MILLIGRAM(S): at 19:35

## 2023-01-22 RX ADMIN — Medication 975 MILLIGRAM(S): at 20:45

## 2023-01-22 RX ADMIN — Medication 975 MILLIGRAM(S): at 10:09

## 2023-01-22 RX ADMIN — ENOXAPARIN SODIUM 40 MILLIGRAM(S): 100 INJECTION SUBCUTANEOUS at 00:20

## 2023-01-23 ENCOUNTER — TRANSCRIPTION ENCOUNTER (OUTPATIENT)
Age: 38
End: 2023-01-23

## 2023-01-23 VITALS
SYSTOLIC BLOOD PRESSURE: 133 MMHG | RESPIRATION RATE: 18 BRPM | OXYGEN SATURATION: 99 % | TEMPERATURE: 98 F | DIASTOLIC BLOOD PRESSURE: 81 MMHG | HEART RATE: 60 BPM

## 2023-01-23 LAB
ALBUMIN SERPL ELPH-MCNC: 2.9 G/DL — LOW (ref 3.3–5)
ALP SERPL-CCNC: 125 U/L — HIGH (ref 40–120)
ALT FLD-CCNC: 34 U/L — SIGNIFICANT CHANGE UP (ref 10–45)
ANION GAP SERPL CALC-SCNC: 9 MMOL/L — SIGNIFICANT CHANGE UP (ref 5–17)
AST SERPL-CCNC: 44 U/L — HIGH (ref 10–40)
BASOPHILS # BLD AUTO: 0 K/UL — SIGNIFICANT CHANGE UP (ref 0–0.2)
BASOPHILS NFR BLD AUTO: 0 % — SIGNIFICANT CHANGE UP (ref 0–2)
BILIRUB SERPL-MCNC: 0.6 MG/DL — SIGNIFICANT CHANGE UP (ref 0.2–1.2)
BUN SERPL-MCNC: 8 MG/DL — SIGNIFICANT CHANGE UP (ref 7–23)
CALCIUM SERPL-MCNC: 8.4 MG/DL — SIGNIFICANT CHANGE UP (ref 8.4–10.5)
CHLORIDE SERPL-SCNC: 103 MMOL/L — SIGNIFICANT CHANGE UP (ref 96–108)
CO2 SERPL-SCNC: 23 MMOL/L — SIGNIFICANT CHANGE UP (ref 22–31)
CREAT SERPL-MCNC: 0.74 MG/DL — SIGNIFICANT CHANGE UP (ref 0.5–1.3)
EGFR: 107 ML/MIN/1.73M2 — SIGNIFICANT CHANGE UP
EOSINOPHIL # BLD AUTO: 0 K/UL — SIGNIFICANT CHANGE UP (ref 0–0.5)
EOSINOPHIL NFR BLD AUTO: 0 % — SIGNIFICANT CHANGE UP (ref 0–6)
GIANT PLATELETS BLD QL SMEAR: PRESENT — SIGNIFICANT CHANGE UP
GLUCOSE SERPL-MCNC: 115 MG/DL — HIGH (ref 70–99)
HCT VFR BLD CALC: 29.9 % — LOW (ref 34.5–45)
HGB BLD-MCNC: 10.1 G/DL — LOW (ref 11.5–15.5)
LYMPHOCYTES # BLD AUTO: 13.1 % — SIGNIFICANT CHANGE UP (ref 13–44)
LYMPHOCYTES # BLD AUTO: 2.04 K/UL — SIGNIFICANT CHANGE UP (ref 1–3.3)
MANUAL SMEAR VERIFICATION: SIGNIFICANT CHANGE UP
MCHC RBC-ENTMCNC: 30 PG — SIGNIFICANT CHANGE UP (ref 27–34)
MCHC RBC-ENTMCNC: 33.8 GM/DL — SIGNIFICANT CHANGE UP (ref 32–36)
MCV RBC AUTO: 88.7 FL — SIGNIFICANT CHANGE UP (ref 80–100)
MONOCYTES # BLD AUTO: 0.27 K/UL — SIGNIFICANT CHANGE UP (ref 0–0.9)
MONOCYTES NFR BLD AUTO: 1.7 % — LOW (ref 2–14)
NEUTROPHILS # BLD AUTO: 13.28 K/UL — HIGH (ref 1.8–7.4)
NEUTROPHILS NFR BLD AUTO: 85.2 % — HIGH (ref 43–77)
PLAT MORPH BLD: ABNORMAL
PLATELET # BLD AUTO: 134 K/UL — LOW (ref 150–400)
POLYCHROMASIA BLD QL SMEAR: SLIGHT — SIGNIFICANT CHANGE UP
POTASSIUM SERPL-MCNC: 4.2 MMOL/L — SIGNIFICANT CHANGE UP (ref 3.5–5.3)
POTASSIUM SERPL-SCNC: 4.2 MMOL/L — SIGNIFICANT CHANGE UP (ref 3.5–5.3)
PROT SERPL-MCNC: 6.2 G/DL — SIGNIFICANT CHANGE UP (ref 6–8.3)
RBC # BLD: 3.37 M/UL — LOW (ref 3.8–5.2)
RBC # FLD: 16.1 % — HIGH (ref 10.3–14.5)
RBC BLD AUTO: ABNORMAL
SODIUM SERPL-SCNC: 135 MMOL/L — SIGNIFICANT CHANGE UP (ref 135–145)
SPHEROCYTES BLD QL SMEAR: SLIGHT — SIGNIFICANT CHANGE UP
WBC # BLD: 15.59 K/UL — HIGH (ref 3.8–10.5)
WBC # FLD AUTO: 15.59 K/UL — HIGH (ref 3.8–10.5)

## 2023-01-23 PROCEDURE — 36415 COLL VENOUS BLD VENIPUNCTURE: CPT

## 2023-01-23 PROCEDURE — 86769 SARS-COV-2 COVID-19 ANTIBODY: CPT

## 2023-01-23 PROCEDURE — 86901 BLOOD TYPING SEROLOGIC RH(D): CPT

## 2023-01-23 PROCEDURE — 86900 BLOOD TYPING SEROLOGIC ABO: CPT

## 2023-01-23 PROCEDURE — 87635 SARS-COV-2 COVID-19 AMP PRB: CPT

## 2023-01-23 PROCEDURE — 85025 COMPLETE CBC W/AUTO DIFF WBC: CPT

## 2023-01-23 PROCEDURE — 87389 HIV-1 AG W/HIV-1&-2 AB AG IA: CPT

## 2023-01-23 PROCEDURE — 85384 FIBRINOGEN ACTIVITY: CPT

## 2023-01-23 PROCEDURE — 84156 ASSAY OF PROTEIN URINE: CPT

## 2023-01-23 PROCEDURE — 86850 RBC ANTIBODY SCREEN: CPT

## 2023-01-23 PROCEDURE — 83615 LACTATE (LD) (LDH) ENZYME: CPT

## 2023-01-23 PROCEDURE — 59050 FETAL MONITOR W/REPORT: CPT

## 2023-01-23 PROCEDURE — 87340 HEPATITIS B SURFACE AG IA: CPT

## 2023-01-23 PROCEDURE — 86762 RUBELLA ANTIBODY: CPT

## 2023-01-23 PROCEDURE — 86780 TREPONEMA PALLIDUM: CPT

## 2023-01-23 PROCEDURE — 85027 COMPLETE CBC AUTOMATED: CPT

## 2023-01-23 PROCEDURE — 80053 COMPREHEN METABOLIC PANEL: CPT

## 2023-01-23 PROCEDURE — 88307 TISSUE EXAM BY PATHOLOGIST: CPT

## 2023-01-23 PROCEDURE — 83735 ASSAY OF MAGNESIUM: CPT

## 2023-01-23 PROCEDURE — 82570 ASSAY OF URINE CREATININE: CPT

## 2023-01-23 PROCEDURE — 84550 ASSAY OF BLOOD/URIC ACID: CPT

## 2023-01-23 RX ORDER — IBUPROFEN 200 MG
1 TABLET ORAL
Qty: 0 | Refills: 0 | DISCHARGE
Start: 2023-01-23

## 2023-01-23 RX ORDER — ACETAMINOPHEN 500 MG
3 TABLET ORAL
Qty: 0 | Refills: 0 | DISCHARGE
Start: 2023-01-23

## 2023-01-23 RX ADMIN — Medication 600 MILLIGRAM(S): at 00:00

## 2023-01-23 RX ADMIN — ENOXAPARIN SODIUM 40 MILLIGRAM(S): 100 INJECTION SUBCUTANEOUS at 01:07

## 2023-01-23 RX ADMIN — Medication 600 MILLIGRAM(S): at 12:01

## 2023-01-23 RX ADMIN — Medication 600 MILLIGRAM(S): at 05:42

## 2023-01-23 RX ADMIN — Medication 600 MILLIGRAM(S): at 00:01

## 2023-01-23 RX ADMIN — Medication 975 MILLIGRAM(S): at 09:27

## 2023-01-23 NOTE — DISCHARGE NOTE OB - NS MD DC FALL RISK RISK
For information on Fall & Injury Prevention, visit: [email or web address] OR  [email or web address] OR  https://www.cdc.gov/steadi/patient.html

## 2023-01-23 NOTE — DISCHARGE NOTE OB - PATIENT PORTAL LINK FT
You can access the FollowMyHealth Patient Portal offered by [facility] by registering at the following website: [email or web address]. By joining [facility]’s FollowMyHealth portal, you will also be able to view your health information using other applications (apps) compatible with our system.

## 2023-01-23 NOTE — DISCHARGE NOTE OB - CARE PLAN
Principal Discharge DX:	Preeclampsia   1 Principal Discharge DX:	Preeclampsia  Assessment and plan of treatment:	Please follow up with your OB within 1-2 weeks for a blood pressure check. Check blood pressures at home 3x a day. If your blood pressure is ever greater than 160/110, you develop a headache not relieved by tylenol, visual disturbances, or right upper abdominal pain, call your doctor or the hospital, or go to your nearest emergency room right away.  Secondary Diagnosis:	Postpartum state  Assessment and plan of treatment:	Take Motrin 600mg every 6 hours and/or tylenol 650mg every 6 hours as needed for pain. Call your OBGYN to schedule a follow up appointment in 1-2weeks. Keep incision site clean and dry. Call your OBGYN if you experiences severe abdominal pain not improved by oral pain medications, heavy bright red vaginal bleeding saturating more than 1 pad per hour, red /warmth at incision site, drainage from incision site, or fever greater than 100.4F.

## 2023-01-23 NOTE — DISCHARGE NOTE OB - HOSPITAL COURSE
Pt is a 37yF s/p c-sx c/b severe PEC (thrombocytopenia).  Please see delivery note for details.  During postpartum course patient's vitals were stable, vaginal bleeding appropriate, and pain well controlled.  Platelets uptrended postpartum. On day of discharge patient was ambulating, pt had adequate oral intake, and was voiding freely.  Discharge instructions and precautions were given.  Will return to clinic in 1-2 weeks for incision check and BP check.

## 2023-01-23 NOTE — DISCHARGE NOTE OB - CARE PROVIDER_API CALL
Angel Esquivel  OBSTETRICS AND GYNECOLOGY  [address] [place] [id number]  Phone: [phone]  Fax: [phone]  Follow Up Time:

## 2023-01-23 NOTE — PROGRESS NOTE ADULT - SUBJECTIVE AND OBJECTIVE BOX
Patient evaluated at bedside this morning, resting comfortable in bed, with no acute events overnight.  She reports pain is well controlled with oral pain medications.   She denies headache, dizziness, chest pain, palpitations, shortness of breath, nausea, vomiting or heavy vaginal bleeding.  She has not tried ambulating since procedure, page removed at 0100, patient has not yet voided. Tolerating clear liquids. Passing flatus.      Physical Exam:  Vital Signs Last 24 Hrs  T(C): 36.5 (21 Jan 2023 01:45), Max: 37.3 (20 Jan 2023 14:40)  T(F): 97.7 (21 Jan 2023 01:45), Max: 99.1 (20 Jan 2023 14:40)  HR: 80 (21 Jan 2023 01:45) (79 - 104)  BP: 120/79 (21 Jan 2023 01:45) (113/66 - 152/95)  BP(mean): 82 (20 Jan 2023 22:00) (82 - 82)  RR: 18 (21 Jan 2023 01:45) (17 - 18)  SpO2: 97% (21 Jan 2023 01:45) (96% - 100%)    Parameters below as of 21 Jan 2023 01:45  Patient On (Oxygen Delivery Method): room air        GA: NAD, A+0 x 3  Pulm: no increased WOB  Abd: soft, nontender, nondistended, no rebound or guarding, incision clean, dry and intact, uterus firm at midline, 2 fb below umbilicus  : lochia WNL  Extremities: no swelling or calf tenderness, SCDs in place                            12.4   11.44 )-----------( 103      ( 20 Jan 2023 04:12 )             35.4     01-20    135  |  102  |  7   ----------------------------<  97  4.5   |  22  |  0.85    Ca    9.3      20 Jan 2023 04:12    TPro  6.9  /  Alb  3.7  /  TBili  1.2  /  DBili  x   /  AST  27  /  ALT  26  /  AlkPhos  144<H>  01-20        acetaminophen     Tablet .. 975 milliGRAM(s) Oral <User Schedule>  diphenhydrAMINE 25 milliGRAM(s) Oral every 6 hours PRN  diphtheria/tetanus/pertussis (acellular) Vaccine (Adacel) 0.5 milliLiter(s) IntraMuscular once  enoxaparin Injectable 40 milliGRAM(s) SubCutaneous every 24 hours  ibuprofen  Tablet. 600 milliGRAM(s) Oral every 6 hours  ketorolac   Injectable 30 milliGRAM(s) IV Push every 6 hours  lactated ringers. 1000 milliLiter(s) IV Continuous <Continuous>  lanolin Ointment 1 Application(s) Topical every 6 hours PRN  magnesium hydroxide Suspension 30 milliLiter(s) Oral two times a day PRN  oxyCODONE    IR 5 milliGRAM(s) Oral every 3 hours PRN  oxyCODONE    IR 5 milliGRAM(s) Oral once PRN  oxytocin Infusion 333.333 milliUNIT(s)/Min IV Continuous <Continuous>  simethicone 80 milliGRAM(s) Chew every 4 hours PRN  
Patient evaluated at bedside this morning, resting comfortable in bed.   She reports pain is well controlled with Tylenol and Motrin.   Reports decrease in amount of vaginal bleeding  She has been ambulating without assistance, voiding spontaneously, passing gas, and tolerating regular diet without nausea/vomiting.  Denies HA, scotoma, RUQ pain, SOB     Physical Exam:  Vital Signs Last 24 Hrs  T(C): 36.7 (23 Jan 2023 06:00), Max: 36.7 (22 Jan 2023 10:00)  T(F): 98 (23 Jan 2023 06:00), Max: 98 (22 Jan 2023 10:00)  HR: 92 (23 Jan 2023 06:00) (71 - 95)  BP: 124/80 (23 Jan 2023 06:00) (102/70 - 125/83)  BP(mean): 97 (22 Jan 2023 14:00) (81 - 97)  RR: 16 (23 Jan 2023 06:00) (16 - 18)  SpO2: 96% (23 Jan 2023 06:00) (96% - 99%)    Parameters below as of 23 Jan 2023 06:00  Patient On (Oxygen Delivery Method): room air        GA: Alert, comfortable, NAD  Pulm: Breathing comfortably on RA  Abd: Uterine fundus firm, tenderness appropriate with post-op state, incision clean/dry/intact. NO RUQ tenderness.  Extremities: no swelling or calf tenderness                             9.7    15.12 )-----------( 109      ( 22 Jan 2023 06:10 )             27.7     01-22    135  |  101  |  10  ----------------------------<  108<H>  3.8   |  25  |  0.85    Ca    7.7<L>      22 Jan 2023 06:10  Mg     4.6     01-22    TPro  5.7<L>  /  Alb  2.9<L>  /  TBili  0.4  /  DBili  x   /  AST  20  /  ALT  18  /  AlkPhos  115  01-22        
Patient evaluated at bedside for clinical magnesium check.     She denies visual disturbances including scotoma, headache and right upper quadrant pain. Also denies nausea/vomiting/epigastric pain/shortness of breath. Pain well controlled.      T(C): 36.3 (01-22-23 @ 02:00), Max: 36.7 (01-21-23 @ 18:30)  HR: 94 (01-22-23 @ 02:00) (85 - 95)  BP: 112/73 (01-22-23 @ 02:00) (110/74 - 123/79)  RR: 18 (01-22-23 @ 02:00) (16 - 18)  SpO2: 97% (01-22-23 @ 02:00) (96% - 98%)  Wt(kg): --    Gen: NAD  Pulm: CTAB  Abd: soft, nontender, no rebound or guarding, no epigastric tenderness, liver nonpalpable, fundus palpated   : Peñaloza in place  Ext: Reflexes 2+                          10.1   17.18 )-----------( 105      ( 21 Jan 2023 17:44 )             29.2     01-21    135  |  102  |  7   ----------------------------<  153<H>  4.2   |  23  |  0.85    Ca    8.1<L>      21 Jan 2023 17:44  Mg     4.9     01-22    TPro  5.8<L>  /  Alb  3.0<L>  /  TBili  0.8  /  DBili  x   /  AST  30  /  ALT  21  /  AlkPhos  155<H>  01-21 01-20-23 @ 07:01  -  01-21-23 @ 07:00  --------------------------------------------------------  IN: 500 mL / OUT: 1500 mL / NET: -1000 mL    01-21-23 @ 07:01  -  01-22-23 @ 03:14  --------------------------------------------------------  IN: 1650 mL / OUT: 7375 mL / NET: -5725 mL                    
Patient evaluated at bedside this morning, resting comfortably in bed, no acute events overnight.  She reports pain is well controlled with tylenol and motrin.  She denies headache, vision changes/scotoma, dizziness, chest pain, palpitations, shortness of breath, RUQ pain, epigastric pain, nausea, vomiting, heavy vaginal bleeding or perineal discomfort. Reports decrease in amount of vaginal bleeding and denies clots.  She has been ambulating without assistance, voiding spontaneously, and is breastfeeding.   Tolerating food well, without nausea/vomiting.  Passing flatus.     Physical Exam:  T(C): 36.6 (01-22-23 @ 08:00), Max: 36.6 (01-21-23 @ 22:36)  HR: 95 (01-22-23 @ 08:00) (85 - 95)  BP: 102/70 (01-22-23 @ 08:00) (100/64 - 112/73)  RR: 17 (01-22-23 @ 08:00) (15 - 18)  SpO2: 97% (01-22-23 @ 08:00) (97% - 98%)    GA: NAD  Resp: breathing comfortably on room air  Abd: soft, nontender, nondistended, no rebound or guarding, uterus firm at midline and below umbilicus. No RUQ/epigastric tenderness  Perineum: normal lochia  Extremities: no swelling or calf tenderness  Neuro: reflexes 2+ brachioradialis bilaterally                            9.7    15.12 )-----------( 109      ( 22 Jan 2023 06:10 )             27.7     01-22    135  |  101  |  10  ----------------------------<  108<H>  3.8   |  25  |  0.85    Ca    7.7<L>      22 Jan 2023 06:10  Mg     4.6     01-22    TPro  5.7<L>  /  Alb  2.9<L>  /  TBili  0.4  /  DBili  x   /  AST  20  /  ALT  18  /  AlkPhos  115  01-22    acetaminophen     Tablet .. 975 milliGRAM(s) Oral <User Schedule>  diphenhydrAMINE 25 milliGRAM(s) Oral every 6 hours PRN  diphtheria/tetanus/pertussis (acellular) Vaccine (Adacel) 0.5 milliLiter(s) IntraMuscular once  enoxaparin Injectable 40 milliGRAM(s) SubCutaneous every 24 hours  ibuprofen  Tablet. 600 milliGRAM(s) Oral every 6 hours  lactated ringers. 1000 milliLiter(s) IV Continuous <Continuous>  lactated ringers. 1000 milliLiter(s) IV Continuous <Continuous>  lanolin Ointment 1 Application(s) Topical every 6 hours PRN  magnesium hydroxide Suspension 30 milliLiter(s) Oral two times a day PRN  magnesium sulfate Infusion 1 Gm/Hr IV Continuous <Continuous>  oxyCODONE    IR 5 milliGRAM(s) Oral every 3 hours PRN  oxyCODONE    IR 5 milliGRAM(s) Oral once PRN  oxytocin Infusion 333.333 milliUNIT(s)/Min IV Continuous <Continuous>  simethicone 80 milliGRAM(s) Chew every 4 hours PRN

## 2023-01-23 NOTE — DISCHARGE NOTE OB - PLAN OF CARE
Take Motrin 600mg every 6 hours and/or tylenol 650mg every 6 hours as needed for pain. Call your OBGYN to schedule a follow up appointment in 1-2weeks. Keep incision site clean and dry. Call your OBGYN if you experiences severe abdominal pain not improved by oral pain medications, heavy bright red vaginal bleeding saturating more than 1 pad per hour, red /warmth at incision site, drainage from incision site, or fever greater than 100.4F. Please follow up with your OB within 1-2 weeks for a blood pressure check. Check blood pressures at home 3x a day. If your blood pressure is ever greater than 160/110, you develop a headache not relieved by tylenol, visual disturbances, or right upper abdominal pain, call your doctor or the hospital, or go to your nearest emergency room right away.

## 2023-01-23 NOTE — PROGRESS NOTE ADULT - ASSESSMENT
A&P:  37y POD2 s/p CS complicated by preeclampsia with severe features    1. Preeclampsia: Continue IV Magnesium @1G/hr for 24 hrs post delivery.  No complaints currently.   Antihypertensives: None  Continue to monitor blood pressures  Follow up on Magnesium serum level   2. GI: Regular diet  3. : strict Is and Os, D/C page after discontinuation of IV Magnesiu                           4. Neuro/Pain:  toradol atc, tylenol atc, oxy prn  5. CV: VS per routine  6.  Pulm: Encourage ISS & Ambulation  7. DVT ppx: SCDs, Lovenox 40mg QD  8. Dispo: POD #3/4        
A&P: 37y Female   s/p     pC/S     complicated by preeclampsia with severe features.      1. Preeclampsia:   Continue IV Magnesium @2G/hr for 24 hrs post delivery.   Magnesium clinical checks and serum assay q6 hrs.  Next Mg check @ 6:30   No complaints currently.   BP controlled  2. GI: Diet: Clears as tolerated  3. Neuro: PO pain medications PRN  4. : strict Is and Os, page in place        
37y Female POD#1  s/p C/S, Uncomplicated                                       - Neuro/Pain:  toradol atc, tylenol atc, oxy prn  - CV:  VS per routine, AM CBC pending  - Pulm: Encourage ISS & Ambulation  - GI: Advance as tolerated  - : Peñaloza in place, to be removed this morning, TOV this morning  - DVT ppx: SCDs, Lovenox 40mg QD  - Dispo: POD #2/3
A/P: 37y s/p  section c/b  PEC w/ SF, POD#3, stable  - PEC w/ SF: s/p IV Mg, normotensive overnight, no s/s of PEC this AM  -  Pain: PO motrin and Tylenol, oxycodone for severe pain PRN  -  GI: tolerating regular diet, passing gas  -  : s/p page , urinating without difficulty  -  DVT prophylaxis: encouraged increased ambulation, SCDs, SQL  -  Dispo: POD 3 or 4

## 2023-01-24 LAB
RUBV IGG SER-ACNC: 2.4 INDEX — SIGNIFICANT CHANGE UP
RUBV IGG SER-IMP: POSITIVE — SIGNIFICANT CHANGE UP

## 2023-01-26 DIAGNOSIS — O34.13 MATERNAL CARE FOR BENIGN TUMOR OF CORPUS UTERI, THIRD TRIMESTER: ICD-10-CM

## 2023-01-26 DIAGNOSIS — Z3A.39 39 WEEKS GESTATION OF PREGNANCY: ICD-10-CM

## 2023-01-26 DIAGNOSIS — O32.0XX0 MATERNAL CARE FOR UNSTABLE LIE, NOT APPLICABLE OR UNSPECIFIED: ICD-10-CM

## 2023-01-26 DIAGNOSIS — D57.3 SICKLE-CELL TRAIT: ICD-10-CM

## 2023-01-26 DIAGNOSIS — D69.6 THROMBOCYTOPENIA, UNSPECIFIED: ICD-10-CM

## 2023-01-26 DIAGNOSIS — D25.9 LEIOMYOMA OF UTERUS, UNSPECIFIED: ICD-10-CM

## 2023-01-26 DIAGNOSIS — Z90.79 ACQUIRED ABSENCE OF OTHER GENITAL ORGAN(S): ICD-10-CM

## 2023-02-06 LAB — SURGICAL PATHOLOGY STUDY: SIGNIFICANT CHANGE UP

## 2025-01-09 NOTE — ED ADULT NURSE NOTE - NS_ED_NURSE_TEACHING_TOPIC_ED_A_ED
well developed, well nourished , in no acute distress , ambulating without difficulty , normal communication ability
OB/GYN
